# Patient Record
Sex: MALE | Race: WHITE | Employment: FULL TIME | URBAN - METROPOLITAN AREA
[De-identification: names, ages, dates, MRNs, and addresses within clinical notes are randomized per-mention and may not be internally consistent; named-entity substitution may affect disease eponyms.]

---

## 2017-01-12 ENCOUNTER — HOSPITAL ENCOUNTER (OUTPATIENT)
Dept: LAB | Age: 52
Discharge: HOME | End: 2017-01-12

## 2017-01-12 LAB — SENTARA SPECIMEN COL,SENBCF: NORMAL

## 2017-01-12 PROCEDURE — 99001 SPECIMEN HANDLING PT-LAB: CPT | Performed by: FAMILY MEDICINE

## 2017-01-13 PROBLEM — N20.0 RECURRENT KIDNEY STONES: Status: ACTIVE | Noted: 2017-01-13

## 2017-04-24 ENCOUNTER — OFFICE VISIT (OUTPATIENT)
Dept: ORTHOPEDIC SURGERY | Age: 52
End: 2017-04-24

## 2017-04-24 VITALS
BODY MASS INDEX: 28.38 KG/M2 | RESPIRATION RATE: 18 BRPM | TEMPERATURE: 98.3 F | WEIGHT: 180.8 LBS | SYSTOLIC BLOOD PRESSURE: 139 MMHG | DIASTOLIC BLOOD PRESSURE: 68 MMHG | OXYGEN SATURATION: 96 % | HEIGHT: 67 IN | HEART RATE: 76 BPM

## 2017-04-24 DIAGNOSIS — M51.36 DDD (DEGENERATIVE DISC DISEASE), LUMBAR: Primary | ICD-10-CM

## 2017-04-24 DIAGNOSIS — M51.26 HNP (HERNIATED NUCLEUS PULPOSUS), LUMBAR: ICD-10-CM

## 2017-04-24 DIAGNOSIS — M48.061 LUMBAR SPINAL STENOSIS: ICD-10-CM

## 2017-04-24 NOTE — PROGRESS NOTES
Chief complaint/History of Present Illness:  Chief Complaint   Patient presents with    Back Pain     6 month follow up     HPI  Erin Deshpande is a  45 y.o.  male      HISTORY OF PRESENT ILLNESS:  The patient comes in today for followup of his chronic low back pain. He occasionally gets some paresthesias down into his left leg. 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 It is on those days that he will have to take Tramadol 50 mg. He is only taking about one to two pills per week. He does not need a refill. He also complains of swelling in his hands. He has been evaluated by rheumatology in the past.  They cannot find anything for that. He is also taking Aleve for his arthritic pain. He does have multiple joint pain. PHYSICAL EXAM:  Mr. Og Jay is a 58-year-old male. He is alert and oriented. He has a full weight bearing, non-antalgic gait, 5/5 strength of the bilateral lower extremities, and normal tandem gait. ASSESSENT/PLAN:  This is a patient with lumbar spinal stenosis, degenerative disc disease, and herniated disc that gives him chronic low back pain. He is using Aleve with occasional Tramadol to control his pain. This keeps him very functional and enables him to continue working. He does not need a refill of the Tramadol at this time, so he will call us when he needs one. When he does call, we will give him 60 pills for a three-month supply. His  is appropriate for prescribing. He has a consistent UDS. We will get a UDS upon his return in six months. He will see Dr. Zaira Cr for M.D. followup.         Review of systems:    Past Medical History:   Diagnosis Date    Cancer [facility])     Prostate and colon    Chronic obstructive pulmonary disease ([facility] [address]     Depression     GERD (gastroesophageal reflux disease)     Hypercholesterolemia     Kidney stones     Low back pain with radiation      Past Surgical History:   Procedure Laterality Date    HX CHOLECYSTECTOMY      HX OTHER SURGICAL  2011    Colon resection and prostate surgery for cancer     Social History     Social History    Marital status:      Spouse name: N/A    Number of children: N/A    Years of education: N/A     Occupational History    working           Social History Main Topics    Smoking status: Former Smoker     Packs/day: 0.00     Types: Cigarettes     Quit date: 11/15/2015    Smokeless tobacco: Former User     Quit date: 9/10/2015    Alcohol use Yes      Comment: Saturday's    Drug use: No    Sexual activity: Not on file     Other Topics Concern    Not on file     Social History Narrative     Family History   Problem Relation Age of Onset    Hypertension Mother     Cancer Father      colon    Hypertension Brother        Physical Exam:  Visit Vitals    /68    Pulse 76    Temp 98.3 °F (36.8 °C) (Oral)    Resp 18    Ht 5' 7\" (1.702 m)    Wt 180 lb 12.8 oz (82 kg)    SpO2 96%    BMI 28.32 kg/m2     Pain Scale: 6/10     has been . reviewed and is appropriate    Pt has a consistent UDS in the record      Delma Reza was seen today for back pain. Diagnoses and all orders for this visit:    DDD (degenerative disc disease), lumbar    Lumbar spinal stenosis    HNP (herniated nucleus pulposus), lumbar          Follow-up Disposition:  Return in about 6 months (around 10/24/2017) for dr. Zainab Walls for MD f/u.         We have informed Lico Mccray to notify us for immediate appointment if he has any worsening neurogical symptoms or if an emergency situation presents, then call 911

## 2017-04-24 NOTE — PATIENT INSTRUCTIONS
Back Pain: Care Instructions  Your Care Instructions    Back pain has many possible causes. It is often related to problems with muscles and ligaments of the back. It may also be related to problems with the nerves, discs, or bones of the back. Moving, lifting, standing, sitting, or sleeping in an awkward way can strain the back. Sometimes you don't notice the injury until later. Arthritis is another common cause of back pain. Although it may hurt a lot, back pain usually improves on its own within several weeks. Most people recover in 12 weeks or less. Using good home treatment and being careful not to stress your back can help you feel better sooner. Follow-up care is a key part of your treatment and safety. Be sure to make and go to all appointments, and call your doctor if you are having problems. Its also a good idea to know your test results and keep a list of the medicines you take. How can you care for yourself at home? · Sit or lie in positions that are most comfortable and reduce your pain. Try one of these positions when you lie down:  ¨ Lie on your back with your knees bent and supported by large pillows. ¨ Lie on the floor with your legs on the seat of a sofa or chair. Mana Men on your side with your knees and hips bent and a pillow between your legs. ¨ Lie on your stomach if it does not make pain worse. · Do not sit up in bed, and avoid soft couches and twisted positions. Bed rest can help relieve pain at first, but it delays healing. Avoid bed rest after the first day of back pain. · Change positions every 30 minutes. If you must sit for long periods of time, take breaks from sitting. Get up and walk around, or lie in a comfortable position. · Try using a heating pad on a low or medium setting for 15 to 20 minutes every 2 or 3 hours. Try a warm shower in place of one session with the heating pad. · You can also try an ice pack for 10 to 15 minutes every 2 to 3 hours.  Put a thin cloth between the ice pack and your skin. · Take pain medicines exactly as directed. ¨ If the doctor gave you a prescription medicine for pain, take it as prescribed. ¨ If you are not taking a prescription pain medicine, ask your doctor if you can take an over-the-counter medicine. · Take short walks several times a day. You can start with 5 to 10 minutes, 3 or 4 times a day, and work up to longer walks. Walk on level surfaces and avoid hills and stairs until your back is better. · Return to work and other activities as soon as you can. Continued rest without activity is usually not good for your back. · To prevent future back pain, do exercises to stretch and strengthen your back and stomach. Learn how to use good posture, safe lifting techniques, and proper body mechanics. When should you call for help? Call your doctor now or seek immediate medical care if:  · You have new or worsening numbness in your legs. · You have new or worsening weakness in your legs. (This could make it hard to stand up.)  · You lose control of your bladder or bowels. Watch closely for changes in your health, and be sure to contact your doctor if:  · Your pain gets worse. · You are not getting better after 2 weeks. Where can you learn more? Go to http://real-amanda.info/. Enter O165 in the search box to learn more about \"Back Pain: Care Instructions. \"  Current as of: May 23, 2016  Content Version: 11.2  © 1440-9312 [employer], [employer]. Care instructions adapted under license by [employer] (which disclaims liability or warranty for this information). If you have questions about a medical condition or this instruction, always ask your healthcare professional. [address] any warranty or liability for your use of this information.

## 2017-06-19 ENCOUNTER — OFFICE VISIT (OUTPATIENT)
Dept: SURGERY | Age: 52
End: 2017-06-19

## 2017-06-19 VITALS — RESPIRATION RATE: 14 BRPM | HEIGHT: 67 IN | BODY MASS INDEX: 28.25 KG/M2 | WEIGHT: 180 LBS

## 2017-06-19 DIAGNOSIS — R22.9 SKIN NODULE: Primary | ICD-10-CM

## 2017-06-19 NOTE — MR AVS SNAPSHOT
Visit Information Date & Time Provider Department Dept. Phone Encounter #  
 6/19/2017 10:00 AM Keyon Camarena MD [facility] [phone] [id number] Upcoming Health Maintenance Date Due Hepatitis C Screening 1965 DTaP/Tdap/Td series (1 - Tdap) 8/14/1985 FOBT Q 1 YEAR AGE 50-75 8/14/2014 Pneumococcal 19-64 Highest Risk (2 of 3 - PCV13) 1/26/2017 INFLUENZA AGE 9 TO ADULT 8/1/2017 Allergies as of 6/19/2017  Review Complete On: 6/19/2017 By: Jocelynn Palomo LPN No Known Allergies Current Immunizations  Never Reviewed No immunizations on file. Not reviewed this visit Vitals Resp Height(growth percentile) Weight(growth percentile) BMI Smoking Status 14 5' 7\" (1.702 m) 180 lb (81.6 kg) 28.19 kg/m2 Former Smoker BMI and BSA Data Body Mass Index Body Surface Area  
 28.19 kg/m 2 1.96 m 2 Preferred Pharmacy Pharmacy Name Phone [facility] [address] [phone] Your Updated Medication List  
  
   
This list is accurate as of: 6/19/17 10:30 AM.  Always use your most recent med list.  
  
  
  
  
 aspirin 325 mg tablet Commonly known as:  ASPIRIN  
325 mg. CRESTOR 10 mg tablet Generic drug:  rosuvastatin Take 10 mg by mouth nightly. pantoprazole 40 mg tablet Commonly known as:  PROTONIX Take 40 mg by mouth daily. sertraline 50 mg tablet Commonly known as:  ZOLOFT Take  by mouth daily. [address] Take  by inhalation daily. traMADol 50 mg tablet Commonly known as:  ULTRAM  
2 po bid prn pain  Indications: PAIN Patient Instructions Call the office at [phone] if you have any questions or concerns. Please provide this summary of care documentation to your next provider. Your primary care clinician is listed as Tommy Presybeterian. If you have any questions after today's visit, please call [phone].

## 2017-06-19 NOTE — PROGRESS NOTES
Progress Note    Patient: Trista Godinez  MRN: [id number]  SSN: [id number]   YOB: 1965  Age: 45 y.o. Sex: male     Chief Complaint   Patient presents with    Cyst     cyst on front of neck        HPI    Mr. Vero Ceballos is a 58-year-old gentleman sent by his PCP for some kind a cyst just below his sternal notch. Though the patient seems to feel it seated I can either see it or feel it. We have talked about sebaceous cysts and how to remove them but we have also discussed the fact that if I cannot feel C it I cannot take it out. He has had no history of radiation to his head or neck. He has had colon and prostate cancer. This is not in an area where lymphadenopathy would be he tells me at superficial and in the skin. I have asked him to return should it enlarge. Past Medical History:   Diagnosis Date    Cancer [facility])     Prostate and colon    Chronic obstructive pulmonary disease ([facility] [address]     Depression     GERD (gastroesophageal reflux disease)     Hypercholesterolemia     Kidney stones     Low back pain with radiation      Past Surgical History:   Procedure Laterality Date    HX CHOLECYSTECTOMY      HX OTHER SURGICAL  2011    Colon resection and prostate surgery for cancer     No Known Allergies  Current Outpatient Prescriptions   Medication Sig Dispense Refill    traMADol (ULTRAM) 50 mg tablet 2 po bid prn pain  Indications: PAIN 120 Tab 2    aspirin (ASPIRIN) 325 mg tablet 325 mg.      TIOTROPIUM BROMIDE (SPIRIVA RESPIMAT IN) Take  by inhalation daily.  sertraline (ZOLOFT) 50 mg tablet Take  by mouth daily.  rosuvastatin (CRESTOR) 10 mg tablet Take 10 mg by mouth nightly.  pantoprazole (PROTONIX) 40 mg tablet Take 40 mg by mouth daily.          Social History     Social History    Marital status:      Spouse name: N/A    Number of children: N/A    Years of education: N/A     Occupational History    working           Social History Main Topics  Smoking status: Former Smoker     Packs/day: 0.00     Types: Cigarettes     Quit date: 11/15/2015    Smokeless tobacco: Former User     Quit date: 9/10/2015    Alcohol use Yes      Comment: Saturday's    Drug use: No    Sexual activity: Not on file     Other Topics Concern    Not on file     Social History Narrative     Family History   Problem Relation Age of Onset    Hypertension Mother     Cancer Father      colon    Hypertension Brother          Review of systems:  Patient denies any reflux, emesis, abdominal pain, change in bowel habits, hematochezia, melena, fever, weight loss, fatigue chills, dermatitis, abnormal moles, change in vision, vertigo, epistaxis, dysphagia, hoarseness, chest pain, palpitations, hypertension, edema, cough, shortness of breath, wheezing, hemoptysis, snoring, hematuria, diabetes, thyroid disease, anemia, bruising, history of blood transfusion, dizziness, headache, or fainting. Physical Examination    Well developed well nourished male in no apparent distress  Visit Vitals    Resp 14    Ht 5' 7\" (1.702 m)    Wt 81.6 kg (180 lb)    BMI 28.19 kg/m2      Head: normocephalic, atraumatic  Mouth: Clear, no overt lesions, oral mucosa pink and moist  Neck: supple, no masses, no adenopathy or carotid bruits, trachea midline  Resp: clear to auscultation bilaterally, no wheeze, rhonchi or rales, excursions normal and symmetrical  Cardio: Regular rate and rhythm, no murmurs, clicks, gallops or rubs, no edema or varicosities  Abdomen: soft, nontender, nondistended, normoactive bowel sounds, no hernias, no hepatosplenomegaly,   Back: Deferred  Extremeties: warm, well-perfused, no tenderness or swelling, normal gait/station  Neuro: sensation and strength grossly intact and symmetrical  Psych: alert and oriented to person, place and time  Breast exam deferred    IMPRESSION  Mass in the subcutaneous tissue just inferior to the sternal notch not visible or palpable.     PLAN  No orders of the defined types were placed in this encounter.     Follow-up as needed  Jackson Beaulieu MD

## 2017-10-09 ENCOUNTER — TELEPHONE (OUTPATIENT)
Dept: ORTHOPEDIC SURGERY | Age: 52
End: 2017-10-09

## 2017-10-09 NOTE — TELEPHONE ENCOUNTER
Spoke with patient, advised him that a follow up is needed prior to administering med refill, patient understood and appointment is set for October 11, 2017 at 11am.

## 2017-10-09 NOTE — TELEPHONE ENCOUNTER
Patient is requesting refill of Ultram (via [employer] website contact us form). States he had refills remaining from last rx but they have . Per last note patient would be due for a 6 month follow up toward the end of this month. Please advise and route msg accordingly.

## 2017-10-09 NOTE — TELEPHONE ENCOUNTER
Please have this patient make a medication follow up.  We will have to see him prior to prescribing this medication

## 2017-11-01 ENCOUNTER — HOSPITAL ENCOUNTER (OUTPATIENT)
Dept: LAB | Age: 52
Discharge: HOME | End: 2017-11-01

## 2017-11-01 LAB — SENTARA SPECIMEN COL,SENBCF: NORMAL

## 2017-11-01 PROCEDURE — 99001 SPECIMEN HANDLING PT-LAB: CPT | Performed by: INTERNAL MEDICINE

## 2018-02-08 ENCOUNTER — OFFICE VISIT (OUTPATIENT)
Dept: ORTHOPEDIC SURGERY | Age: 53
End: 2018-02-08

## 2018-02-08 VITALS
SYSTOLIC BLOOD PRESSURE: 127 MMHG | BODY MASS INDEX: 28.88 KG/M2 | HEART RATE: 66 BPM | OXYGEN SATURATION: 96 % | HEIGHT: 67 IN | TEMPERATURE: 97.8 F | RESPIRATION RATE: 20 BRPM | DIASTOLIC BLOOD PRESSURE: 66 MMHG | WEIGHT: 184 LBS

## 2018-02-08 DIAGNOSIS — M51.26 PROTRUSION OF LUMBAR INTERVERTEBRAL DISC: ICD-10-CM

## 2018-02-08 DIAGNOSIS — M48.062 SPINAL STENOSIS OF LUMBAR REGION WITH NEUROGENIC CLAUDICATION: Primary | ICD-10-CM

## 2018-02-08 DIAGNOSIS — M51.36 DDD (DEGENERATIVE DISC DISEASE), LUMBAR: Chronic | ICD-10-CM

## 2018-02-08 DIAGNOSIS — M48.062 SPINAL STENOSIS OF LUMBAR REGION WITH NEUROGENIC CLAUDICATION: ICD-10-CM

## 2018-02-08 DIAGNOSIS — Z79.891 ENCOUNTER FOR LONG-TERM OPIATE ANALGESIC USE: ICD-10-CM

## 2018-02-08 RX ORDER — TRAMADOL HYDROCHLORIDE 50 MG/1
50 TABLET ORAL
Qty: 60 TAB | Refills: 2 | Status: SHIPPED | OUTPATIENT
Start: 2018-02-08 | End: 2018-10-29 | Stop reason: SDUPTHER

## 2018-02-08 NOTE — MR AVS SNAPSHOT
[facility] [address] [facility] [address] 
[phone] Patient: Zbigniew Dozier MRN: [id number] :1965 Visit Information Date & Time Provider Department Dept. Phone Encounter #  
 2018 11:00 AM Effie Pierre NP [facility] [phone] [id number] Follow-up Instructions Return in about 6 months (around 2018) for with Narcisa. Your Appointments 2018  3:00 PM  
New Patient with Jonathan Vargas MD  
[facility] [address]) Appt Note: NP   needs  to  seen asap  (bell palsy)  [facility]  [facility]  . ...11; Dr. Farhad Crain; seizure; note in cc; np packet mailed bdc; 2017 Notes received from Dr. Colonel Contreras  placed in file; r/s due to work trip [address] [address] [phone]  
  
   
 [facility] [id number] Upcoming Health Maintenance Date Due Hepatitis C Screening 1965 DTaP/Tdap/Td series (1 - Tdap) 1985 FOBT Q 1 YEAR AGE 50-75 2014 Pneumococcal 19-64 Highest Risk (2 of 3 - PCV13) 2017 Influenza Age 5 to Adult 2017 Allergies as of 2018  Review Complete On: 2018 By: Wily Sherman LPN No Known Allergies Current Immunizations  Never Reviewed No immunizations on file. Not reviewed this visit You Were Diagnosed With   
  
 Codes Comments Spinal stenosis of lumbar region with neurogenic claudication    -  Primary ICD-10-CM: M48.062 
ICD-9-CM: 724.03   
 DDD (degenerative disc disease), lumbar     ICD-10-CM: M51.36 
ICD-9-CM: 722.52 Protrusion of lumbar intervertebral disc     ICD-10-CM: M51.26 
ICD-9-CM: 722.10 Encounter for long-term opiate analgesic use     ICD-10-CM: D29.626 ICD-9-CM: V58.69 Vitals BP Pulse Temp Resp Height(growth percentile) Weight(growth percentile) 127/66 66 97.8 °F (36.6 °C) (Oral) 20 5' 7\" (1.702 m) 184 lb (83.5 kg) SpO2 BMI Smoking Status 96% 28.82 kg/m2 Former Smoker BMI and BSA Data Body Mass Index Body Surface Area  
 28.82 kg/m 2 1.99 m 2 Preferred Pharmacy Pharmacy Name Phone [facility] [address] [place] [phone] Your Updated Medication List  
  
   
This list is accurate as of: 2/8/18 11:08 AM.  Always use your most recent med list.  
  
  
  
  
 aspirin 325 mg tablet Commonly known as:  ASPIRIN  
325 mg. CRESTOR 10 mg tablet Generic drug:  rosuvastatin Take 10 mg by mouth nightly. pantoprazole 40 mg tablet Commonly known as:  PROTONIX Take 40 mg by mouth daily. sertraline 50 mg tablet Commonly known as:  ZOLOFT Take  by mouth daily. [address] Take  by inhalation daily. traMADol 50 mg tablet Commonly known as:  ULTRAM  
Take 1 Tab by mouth two (2) times daily as needed. Max Daily Amount: 100 mg. 2 po bid prn pain  Indications: Pain, chronic pain Prescriptions Printed Refills  
 traMADol (ULTRAM) 50 mg tablet 2 Sig: Take 1 Tab by mouth two (2) times daily as needed. Max Daily Amount: 100 mg. 2 po bid prn pain  Indications: Pain, chronic pain  
 Class: Print Route: Oral  
  
Follow-up Instructions Return in about 6 months (around 8/8/2018) for with Narcisa. To-Do List   
 02/08/2018 Lab:  DRUG SCREEN UR - W/ CONFIRM Introducing hospitals & HEALTH SERVICES! Dear Mattie Zapata: Thank you for requesting a [employer] account. Our records indicate that you have previously registered for a [employer] account but its currently inactive. Please call our [employer] support line at [phone]. Additional Information If you have questions, please visit the Frequently Asked Questions section of the [employer] website at https://[employer]. [employer]/. Remember, MyChart is NOT to be used for urgent needs. For medical emergencies, dial 911. Now available from your iPhone and Android! Please provide this summary of care documentation to your next provider. Your primary care clinician is listed as Lazaro Schafer. If you have any questions after today's visit, please call [phone].

## 2018-02-08 NOTE — PROGRESS NOTES
Chief complaint/History of Present Illness:  Chief Complaint   Patient presents with    Back Pain     lower back pain follow up    Arm Pain    Hand Pain     HPI  Evie Sifuentes is a  47 y.o.  male      HISTORY OF PRESENT ILLNESS:  The patient comes in today for follow up of his chronic low back pain. He gets pain into his left lower extremity with paresthesias. He is using Aleve most of the time to help control his pain, but there are times when he needs to have a Tramadol. When he does excessive activity, he will take two tablets and then lie down. 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 That is when his pain is really bad, and he has to take the most Tramadol. It will bring his pain from an 8-10/10 down to a 2/10. Since we last saw him, between October and January, he was having some bradycardia and dizziness. He had that worked up. They did not really find anything. They referred him to a neurologist.  He apparently blacked out a couple of times during that time period. Other than that, he has had no new medical issues. He denies fever and bowel and bladder dysfunction. PHYSICAL EXAM:  Mr. La Silva is a 58-year-old male. He is alert and oriented. He has normal mood and affect. He has a full weightbearing, non-antalgic gait with 4/5 strength of the bilateral lower extremities and negative straight leg raise. He has pain with hyperextension of the lumbar spine. ASSESSENT/PLAN:  This is a patient with degenerative disc disease, spinal stenosis, and disc protrusion that gives him chronic back pain with left lower extremity pain and paresthesias. His ORT is 0/10. His last UDS was consistent. We are getting one today. His MME is 20. His  is appropriate. He did get one prescription of Percocet since we saw him for 12 pills. He thinks it may have been when he had kidney stones.   We will give him a refill of his Tramadol. He does not use it very much at all. We will see him back in six months or sooner needed. Review of systems:    Past Medical History:   Diagnosis Date    Cancer [facility])     Prostate and colon    Chronic obstructive pulmonary disease ([address]     Depression     GERD (gastroesophageal reflux disease)     Hypercholesterolemia     Kidney stones     Low back pain with radiation      Past Surgical History:   Procedure Laterality Date    HX CHOLECYSTECTOMY      HX OTHER SURGICAL  2011    Colon resection and prostate surgery for cancer     Social History     Social History    Marital status:      Spouse name: N/A    Number of children: N/A    Years of education: N/A     Occupational History    working           Social History Main Topics    Smoking status: Former Smoker     Packs/day: 0.00     Types: Cigarettes     Quit date: 11/15/2015    Smokeless tobacco: Former User     Quit date: 9/10/2015    Alcohol use Yes      Comment: Saturday's    Drug use: No    Sexual activity: Not on file     Other Topics Concern    Not on file     Social History Narrative     Family History   Problem Relation Age of Onset    Hypertension Mother     Cancer Father      colon    Hypertension Brother        Physical Exam:  Visit Vitals    /66    Pulse 66    Temp 97.8 °F (36.6 °C) (Oral)    Resp 20    Ht 5' 7\" (1.702 m)    Wt 184 lb (83.5 kg)    SpO2 96%    BMI 28.82 kg/m2     Pain Scale: 6/10    Least pain over the last week has been 2/10. Worst pain over the last week has been 10/10. Opioid Risk Tool Reviewed: YES, Score 0  Aberrant behaviors: None. Urine Drug Screen: orders placed today. Controlled substance agreement on file: yes.  reviewed:yes  Pill count is consistent with his prescription: n/a  Concomitant use of a benzodiazepine: no  MME 20  Narcan not warranted        Risks and benefits of ongoing opiate therapy have been reviewed with the patient.   No pain behaviors. Denies thoughts of harming self or others. Pt has a good risk to benefit ratio which allows the pt to function in a home environment without side effects        has been . reviewed and is appropriate    Pt has a consistent UDS in the record      Diagnoses and all orders for this visit:    1. Spinal stenosis of lumbar region with neurogenic claudication  -     traMADol (ULTRAM) 50 mg tablet; Take 1 Tab by mouth two (2) times daily as needed. Max Daily Amount: 100 mg. 2 po bid prn pain  Indications: Pain, chronic pain  -     DRUG SCREEN UR - W/ CONFIRM; Future    2. DDD (degenerative disc disease), lumbar  -     traMADol (ULTRAM) 50 mg tablet; Take 1 Tab by mouth two (2) times daily as needed. Max Daily Amount: 100 mg. 2 po bid prn pain  Indications: Pain, chronic pain  -     DRUG SCREEN UR - W/ CONFIRM; Future    3. Protrusion of lumbar intervertebral disc  -     traMADol (ULTRAM) 50 mg tablet; Take 1 Tab by mouth two (2) times daily as needed. Max Daily Amount: 100 mg. 2 po bid prn pain  Indications: Pain, chronic pain  -     DRUG SCREEN UR - W/ CONFIRM; Future    4. Encounter for long-term opiate analgesic use  -     traMADol (ULTRAM) 50 mg tablet; Take 1 Tab by mouth two (2) times daily as needed. Max Daily Amount: 100 mg. 2 po bid prn pain  Indications: Pain, chronic pain  -     DRUG SCREEN UR - W/ CONFIRM; Future            Follow-up Disposition:  Return in about 6 months (around 8/8/2018) for with Blas Berumen.         We have informed Gadiel Turnerdionna to notify us for immediate appointment if he has any worsening neurogical symptoms or if an emergency situation presents, then call 911

## 2018-02-16 ENCOUNTER — TELEPHONE (OUTPATIENT)
Dept: ORTHOPEDIC SURGERY | Age: 53
End: 2018-02-16

## 2018-02-16 NOTE — TELEPHONE ENCOUNTER
Breezy Jolly ([facility])  Need help? Call us at [phone]  Status: Sent to [address] February 14  Next Steps: The plan will fax you a determination, typically within 1 to 5 business days.

## 2018-03-12 ENCOUNTER — OFFICE VISIT (OUTPATIENT)
Dept: NEUROLOGY | Age: 53
End: 2018-03-12

## 2018-03-12 VITALS
TEMPERATURE: 98.1 F | DIASTOLIC BLOOD PRESSURE: 74 MMHG | SYSTOLIC BLOOD PRESSURE: 134 MMHG | HEIGHT: 67 IN | WEIGHT: 183 LBS | RESPIRATION RATE: 16 BRPM | HEART RATE: 74 BPM | OXYGEN SATURATION: 95 % | BODY MASS INDEX: 28.72 KG/M2

## 2018-03-12 DIAGNOSIS — S06.0X1S CONCUSSION WITH LOSS OF CONSCIOUSNESS OF 30 MINUTES OR LESS, SEQUELA (HCC): Primary | ICD-10-CM

## 2018-03-12 DIAGNOSIS — I69.392 CVA, OLD, FACIAL WEAKNESS: ICD-10-CM

## 2018-03-12 DIAGNOSIS — Z86.69 H/O BELL'S PALSY: ICD-10-CM

## 2018-03-12 PROBLEM — S06.0X1A CONCUSSION WITH LOSS OF CONSCIOUSNESS OF 30 MINUTES OR LESS: Status: ACTIVE | Noted: 2018-03-12
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
## 2018-03-22 ENCOUNTER — HOSPITAL ENCOUNTER (OUTPATIENT)
Age: 53
Discharge: HOME | End: 2018-03-22
Attending: PSYCHIATRY & NEUROLOGY
Payer: COMMERCIAL

## 2018-03-22 DIAGNOSIS — S06.0X1S CONCUSSION WITH LOSS OF CONSCIOUSNESS OF 30 MINUTES OR LESS, SEQUELA (HCC): ICD-10-CM

## 2018-03-22 DIAGNOSIS — I69.392 CVA, OLD, FACIAL WEAKNESS: ICD-10-CM

## 2018-03-22 PROCEDURE — 70549 MR ANGIOGRAPH NECK W/O&W/DYE: CPT

## 2018-03-22 PROCEDURE — 74011250636 HC RX REV CODE- 250/636: Performed by: PSYCHIATRY & NEUROLOGY

## 2018-03-22 PROCEDURE — 70553 MRI BRAIN STEM W/O & W/DYE: CPT

## 2018-03-22 PROCEDURE — 70544 MR ANGIOGRAPHY HEAD W/O DYE: CPT

## 2018-03-22 PROCEDURE — A9585 GADOBUTROL INJECTION: HCPCS | Performed by: PSYCHIATRY & NEUROLOGY

## 2018-03-22 RX ADMIN — GADOBUTROL 10 ML: 604.72 INJECTION INTRAVENOUS at 07:00

## 2018-04-02 NOTE — PROGRESS NOTES
Spoke with patient concerning result and provider's comment. Patient would like to cancel appointment at this time.

## 2018-05-17 ENCOUNTER — HOSPITAL ENCOUNTER (OUTPATIENT)
Dept: GENERAL RADIOLOGY | Age: 53
Discharge: HOME | End: 2018-05-17
Payer: COMMERCIAL

## 2018-05-17 DIAGNOSIS — R06.02 SHORTNESS OF BREATH: ICD-10-CM

## 2018-05-17 PROCEDURE — 71046 X-RAY EXAM CHEST 2 VIEWS: CPT

## 2018-06-20 ENCOUNTER — HOSPITAL ENCOUNTER (OUTPATIENT)
Dept: CT IMAGING | Age: 53
Discharge: HOME | End: 2018-06-20
Attending: NURSE PRACTITIONER
Payer: COMMERCIAL

## 2018-06-20 DIAGNOSIS — R06.02 SHORTNESS OF BREATH ON EXERTION: ICD-10-CM

## 2018-06-20 LAB — CREAT UR-MCNC: 1 MG/DL (ref 0.6–1.3)

## 2018-06-20 PROCEDURE — 71270 CT THORAX DX C-/C+: CPT

## 2018-06-20 PROCEDURE — 82565 ASSAY OF CREATININE: CPT

## 2018-06-20 PROCEDURE — 74011636320 HC RX REV CODE- 636/320

## 2018-06-20 RX ADMIN — IOPAMIDOL 80 ML: 612 INJECTION, SOLUTION INTRAVENOUS at 07:37

## 2018-07-06 ENCOUNTER — HOSPITAL ENCOUNTER (OUTPATIENT)
Dept: PET IMAGING | Age: 53
Discharge: HOME | End: 2018-07-06
Attending: NURSE PRACTITIONER
Payer: COMMERCIAL

## 2018-07-06 DIAGNOSIS — R91.8 PULMONARY NODULES: ICD-10-CM

## 2018-07-06 PROCEDURE — A9552 F18 FDG: HCPCS

## 2018-07-10 ENCOUNTER — OFFICE VISIT (OUTPATIENT)
Dept: PULMONOLOGY | Age: 53
End: 2018-07-10

## 2018-07-10 VITALS
WEIGHT: 182 LBS | RESPIRATION RATE: 19 BRPM | HEART RATE: 72 BPM | SYSTOLIC BLOOD PRESSURE: 150 MMHG | HEIGHT: 67 IN | DIASTOLIC BLOOD PRESSURE: 70 MMHG | BODY MASS INDEX: 28.56 KG/M2 | OXYGEN SATURATION: 98 % | TEMPERATURE: 97.9 F

## 2018-07-10 DIAGNOSIS — R91.8 PULMONARY NODULES: ICD-10-CM

## 2018-07-10 DIAGNOSIS — J44.9 CHRONIC OBSTRUCTIVE PULMONARY DISEASE, UNSPECIFIED COPD TYPE (HCC): Primary | ICD-10-CM

## 2018-07-10 RX ORDER — IBUPROFEN 200 MG
1 TABLET ORAL EVERY 24 HOURS
Qty: 30 PATCH | Refills: 1 | Status: SHIPPED | OUTPATIENT
Start: 2018-07-10 | End: 2018-08-09

## 2018-07-10 RX ORDER — ALBUTEROL SULFATE 90 UG/1
2 AEROSOL, METERED RESPIRATORY (INHALATION)
COMMUNITY

## 2018-07-10 RX ORDER — PRAVASTATIN SODIUM 20 MG/1
20 TABLET ORAL
COMMUNITY

## 2018-07-10 NOTE — PROGRESS NOTES
Mr. Yordan Motley has a reminder for a \"due or due soon\" health maintenance. I have asked that he contact his primary care provider for follow-up on this health maintenance.   Chief Complaint   Patient presents with    COPD

## 2018-07-10 NOTE — PROGRESS NOTES
[facility]  Pulmonary, Critical Care, and Sleep Medicine    Dear Aimee Moya,    Chief complaint:  Pulmonary nodules    HPI:  Mary Traylor is 47years old and comes to the office today at your request concerning pulmonary nodules which were noted incidentally when evaluating respiratory symptoms approximately 2 months ago. Since then the patient has had a PET scan. He relates that his respiratory symptoms have improved but he still has chronic shortness of breath for about a year when he exerts himself with activity like hurrying. He denies chest pain chronic cough or ankle swelling. He does have difficulty with sleeping at times and some daytime sleepiness but no witnessed sleep apnea  No Known Allergies  Current Outpatient Prescriptions   Medication Sig    albuterol (PROAIR HFA) 90 mcg/actuation inhaler Take  by inhalation.  umeclidinium-vilanterol (ANORO ELLIPTA) 62.5-25 mcg/actuation inhaler Take 1 Puff by inhalation daily.  pravastatin (PRAVACHOL) 20 mg tablet Take 20 mg by mouth nightly.  multivit-min/FA/lycopen/lutein (CENTRUM SILVER MEN PO) Take  by mouth.  traMADol (ULTRAM) 50 mg tablet Take 1 Tab by mouth two (2) times daily as needed. Max Daily Amount: 100 mg. 2 po bid prn pain  Indications: Pain, chronic pain    sertraline (ZOLOFT) 50 mg tablet Take  by mouth daily.  pantoprazole (PROTONIX) 40 mg tablet Take 40 mg by mouth daily.  aspirin (ASPIRIN) 325 mg tablet 325 mg.    TIOTROPIUM BROMIDE (SPIRIVA RESPIMAT IN) Take  by inhalation daily.  rosuvastatin (CRESTOR) 10 mg tablet Take 10 mg by mouth nightly. No current facility-administered medications for this visit.       Past Medical History:   Diagnosis Date    Cancer [facility])     Prostate and colon    Chronic lung disease     Chronic obstructive pulmonary disease ([facility] [address]     Depression     GERD (gastroesophageal reflux disease)     Hypercholesterolemia     Kidney stones     Low back pain with radiation    He denies a history of heart disease hypertension diabetes liver disease ulcers tuberculosis cancer  Past Surgical History:   Procedure Laterality Date    HX CHOLECYSTECTOMY      HX OTHER SURGICAL  2011    Colon resection and prostate surgery for cancer       Family history: Noncontributory    Social History: Started smoking about 38 years ago and continues to smoke about 10 cigarettes or more a day. 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   He denies focal muscle weakness or numbness trouble hearing trouble seeing trouble swallowing chronic abdominal pain melena or blood in his stools dysuria hematuria rashes but has significant arthritic complaints he denies fever chills poor appetite or significant weight loss    Physical Exam:  Visit Vitals    /70 (BP 1 Location: Left arm, BP Patient Position: At rest)    Pulse 72    Temp 97.9 °F (36.6 °C) (Oral)    Resp 19    Ht 5' 7\" (1.702 m)    Wt 82.6 kg (182 lb)    SpO2 98%    BMI 28.51 kg/m2     Well-developed well-nourished  HEENT: pupils equal, reactive, sclera, non-icteric   Oropharynx tongue: normal   Neck: Supple   Lymph Nodes: Supra clavicular and cervical nodes, negative   Chest: Equal symmetrical expansion, no dullness, no wheezes, rales or rubs   Heart: Regular, rhythm without afia or murmur   Abdomen: soft, non-tender no masses or organomegaly   Extremities: no cyanosis, clubbing, no edema no calf tenderness  Musculoskeletal: No acute joint inflammation or muscle wasting  Skin: No rash   Neurological: alert, oriented, moves all extremities      LABS:  O2 sat room air at rest 98%  Chest x-ray 5/17/18 personally reviewed hypoventilation possible elevated right diaphragm  CT of the chest 6/20/18 personally reviewed with 2 pulmonary nodules about 6 mm in size almost next to each other in the right lower  PET scan 7/6/18 showing low uptake for the 2 pulmonary nodules but a higher than normal background  Impression:   Pulmonary nodules and high risk individual due to cigarette smoking with PET scan showing no uptake but the nodules are too small to rely on this parameter for malignancy. Nicotine dependency    Plan:  Follow-up CT at 3 months per Fleischner Society recommendations for multiple pulmonary nodules  The patient was given a modest strategy to help him stop smoking    Thanks for allowing me to share in this patient's evaluation    Sincerely,    Marisel Oden MD , [facility]    CC: Lorraine Barr MD    [address] [facility]. [address]  [place], [address]     P: [phone]     F: [phone]

## 2018-07-10 NOTE — MR AVS SNAPSHOT
[address] [facility], [address] [address] 
[phone] Patient: Polina Wall MRN: [id number] :1965 Visit Information Date & Time Provider Department Dept. Phone Encounter #  
 7/10/2018  2:30 PM Love Michael MD [id number] [id number] Follow-up Instructions Return in about 4 months (around 11/10/2018). Follow-up and Disposition History Your Appointments 2018  9:15 AM  
Follow Up with Love Michael MD  
[address] ([address]) Appt Note: FROM 7/10/18 - CT PRIOR  
 [address] [place] [address], [address] [address]  
[phone]  
  
   
 [address] [place] [address], [address] [facility] [place] [address] Upcoming Health Maintenance Date Due Hepatitis C Screening 1965 DTaP/Tdap/Td series (1 - Tdap) 1985 FOBT Q 1 YEAR AGE 50-75 2014 Pneumococcal 19-64 Highest Risk (2 of 3 - PCV13) 2017 Influenza Age 5 to Adult 2018 Allergies as of 7/10/2018  Review Complete On: 7/10/2018 By: RT Joe No Known Allergies Current Immunizations  Never Reviewed No immunizations on file. Not reviewed this visit You Were Diagnosed With   
  
 Codes Comments Chronic obstructive pulmonary disease, unspecified COPD type ([facility]    -  Primary ICD-10-CM: J44.9 ICD-9-CM: 021 Pulmonary nodules     ICD-10-CM: R91.8 ICD-9-CM: 793.19 Vitals BP Pulse Temp Resp Height(growth percentile) Weight(growth percentile) 150/70 (BP 1 Location: Left arm, BP Patient Position: At rest) 72 97.9 °F (36.6 °C) (Oral) 19 5' 7\" (1.702 m) 182 lb (82.6 kg) SpO2 BMI Smoking Status 98% 28.51 kg/m2 Current Every Day Smoker BMI and BSA Data Body Mass Index Body Surface Area 28.51 kg/m 2 1.98 m 2 Preferred Pharmacy Pharmacy Name Phone [facility] [address] [place] [address] [phone] Your Updated Medication List  
  
   
This list is accurate as of 7/10/18  3:19 PM.  Always use your most recent med list.  
  
  
  
  
 ANORO ELLIPTA 62.5-25 mcg/actuation inhaler Generic drug:  umeclidinium-vilanterol Take 1 Puff by inhalation daily. aspirin 325 mg tablet Commonly known as:  ASPIRIN  
325 mg. CENTRUM SILVER MEN PO Take  by mouth. CRESTOR 10 mg tablet Generic drug:  rosuvastatin Take 10 mg by mouth nightly. nicotine 14 mg/24 hr patch Commonly known as:  NICODERM CQ  
1 Patch by TransDERmal route every twenty-four (24) hours for 30 days. pantoprazole 40 mg tablet Commonly known as:  PROTONIX Take 40 mg by mouth daily. pravastatin 20 mg tablet Commonly known as:  PRAVACHOL Take 20 mg by mouth nightly. PROAIR HFA 90 mcg/actuation inhaler Generic drug:  albuterol Take  by inhalation. sertraline 50 mg tablet Commonly known as:  ZOLOFT Take  by mouth daily. [address] Take  by inhalation daily. traMADol 50 mg tablet Commonly known as:  ULTRAM  
Take 1 Tab by mouth two (2) times daily as needed. Max Daily Amount: 100 mg. 2 po bid prn pain  Indications: Pain, chronic pain Prescriptions Sent to Pharmacy Refills  
 nicotine (NICODERM CQ) 14 mg/24 hr patch 1 Si Patch by TransDERmal route every twenty-four (24) hours for 30 days. Class: Normal  
 Pharmacy: [facility] [address] [place] [address] Ph #: [phone] Route: TransDERmal  
  
We Performed the Following AMB POC PFT COMPLETE W/BRONCHODILATOR [03128 CPT(R)] Follow-up Instructions Return in about 4 months (around 11/10/2018). To-Do List   
 07/10/2018 Procedures: AMB POC PFT COMPLETE W/BRONCHODILATOR Around 2018 Imaging:  CT CHEST WO CONT Patient Instructions Consider discontinuing Anoro Call for symptoms of worsening shortness of breath Do everything you can to stop smoking including substituting hard candy and sugarless gum when you want to smoke. Put the hard candy and sugarless gum in your pockets ashtrays automobile or any place she might consider smoking Use a #14 nicotine patch every day when she stopped smoking now Patient Instructions History Introducing [facility]! [employer] introduces [employer] patient portal. Now you can access parts of your medical record, email your doctor's office, and request medication refills online. 1. In your internet browser, go to [email or web address] 2. Click on the First Time User? Click Here link in the Sign In box. You will see the New Member Sign Up page. 3. Enter your [employer] Access Code exactly as it appears below. You will not need to use this code after youve completed the sign-up process. If you do not sign up before the expiration date, you must request a new code. · [employer] Access Code: [email or web address] Expires: 9/25/2018 11:01 AM 
 
4. Enter the last four digits of your Social Security Number (xxxx) and Date of Birth (mm/dd/yyyy) as indicated and click Submit. You will be taken to the next sign-up page. 5. Create a [employer] ID. This will be your [employer] login ID and cannot be changed, so think of one that is secure and easy to remember. 6. Create a [employer] password. You can change your password at any time. 7. Enter your Password Reset Question and Answer. This can be used at a later time if you forget your password. 8. Enter your e-mail address. You will receive e-mail notification when new information is available in [address] 9. Click Sign Up. You can now view and download portions of your medical record.  
10. Click the Download Summary menu link to download a portable copy of your medical information. If you have questions, please visit the Frequently Asked Questions section of the [id number] website. Remember, [employer] is NOT to be used for urgent needs. For medical emergencies, dial 911. Now available from your iPhone and Android! Please provide this summary of care documentation to your next provider. Your primary care clinician is listed as Darylene Lango. If you have any questions after today's visit, please call [phone].

## 2018-07-10 NOTE — PROGRESS NOTES
Verbal Order with read back per Kala Juárez MD  For PFT smart panel. AMB POC PFT complete w/ bronchodilator  AMB POC PFT complete w/o bronchodilator    Dr. Halima Puri MD will co-sign the orders.

## 2018-07-10 NOTE — PATIENT INSTRUCTIONS
Consider discontinuing Anoro    Call for symptoms of worsening shortness of breath    Do everything you can to stop smoking including substituting hard candy and sugarless gum when you want to smoke.   Put the hard candy and sugarless gum in your pockets ashtrays automobile or any place she might consider smoking    Use a #14 nicotine patch every day when she stopped smoking now

## 2018-08-27 NOTE — TELEPHONE ENCOUNTER
PT CALLED([phone]). [facility] PUT PT ON PATCHES AND THEY ARE NOT WORKING. HE WOULD LIKE TO TRY FRED. [facility] [phone].

## 2018-08-28 RX ORDER — VARENICLINE TARTRATE 25 MG
KIT ORAL
Qty: 1 DOSE PACK | Refills: 5 | Status: SHIPPED | OUTPATIENT
Start: 2018-08-28 | End: 2020-01-07

## 2018-09-24 ENCOUNTER — HOSPITAL ENCOUNTER (OUTPATIENT)
Dept: CT IMAGING | Age: 53
Discharge: HOME | End: 2018-09-24
Attending: INTERNAL MEDICINE
Payer: COMMERCIAL

## 2018-09-24 DIAGNOSIS — R91.8 PULMONARY NODULES: ICD-10-CM

## 2018-09-24 PROCEDURE — 71250 CT THORAX DX C-: CPT

## 2018-09-25 ENCOUNTER — TELEPHONE (OUTPATIENT)
Dept: PULMONOLOGY | Age: 53
End: 2018-09-25

## 2018-09-25 DIAGNOSIS — R91.8 PULMONARY NODULES: Primary | ICD-10-CM

## 2018-09-25 NOTE — TELEPHONE ENCOUNTER
Pt would like results of scan he had done yesterday at [facility]. Please call [phone].

## 2018-09-25 NOTE — TELEPHONE ENCOUNTER
Per verbal order from Dr. Sheila Mckinney. Advise pt CT stable, repeat CT 6mo with f/u appt after CT.  Pt understands

## 2018-10-29 ENCOUNTER — OFFICE VISIT (OUTPATIENT)
Dept: ORTHOPEDIC SURGERY | Age: 53
End: 2018-10-29

## 2018-10-29 VITALS
DIASTOLIC BLOOD PRESSURE: 74 MMHG | SYSTOLIC BLOOD PRESSURE: 133 MMHG | OXYGEN SATURATION: 97 % | HEART RATE: 70 BPM | TEMPERATURE: 98 F | BODY MASS INDEX: 28.88 KG/M2 | RESPIRATION RATE: 18 BRPM | HEIGHT: 67 IN | WEIGHT: 184 LBS

## 2018-10-29 DIAGNOSIS — M48.062 SPINAL STENOSIS OF LUMBAR REGION WITH NEUROGENIC CLAUDICATION: ICD-10-CM

## 2018-10-29 DIAGNOSIS — M51.26 PROTRUSION OF LUMBAR INTERVERTEBRAL DISC: ICD-10-CM

## 2018-10-29 DIAGNOSIS — Z79.891 LONG-TERM CURRENT USE OF OPIATE ANALGESIC: ICD-10-CM

## 2018-10-29 DIAGNOSIS — M51.36 DDD (DEGENERATIVE DISC DISEASE), LUMBAR: ICD-10-CM

## 2018-10-29 DIAGNOSIS — M51.36 DDD (DEGENERATIVE DISC DISEASE), LUMBAR: Primary | ICD-10-CM

## 2018-10-29 RX ORDER — GABAPENTIN 300 MG/1
300 CAPSULE ORAL
Qty: 30 CAP | Refills: 1 | Status: SHIPPED | OUTPATIENT
Start: 2018-10-29 | End: 2021-05-28

## 2018-10-29 RX ORDER — TRAMADOL HYDROCHLORIDE 50 MG/1
50 TABLET ORAL
Qty: 60 TAB | Refills: 0 | Status: SHIPPED | OUTPATIENT
Start: 2018-10-29 | End: 2020-01-07

## 2018-10-29 NOTE — PATIENT INSTRUCTIONS
Back Pain: Care Instructions  Your Care Instructions    Back pain has many possible causes. It is often related to problems with muscles and ligaments of the back. It may also be related to problems with the nerves, discs, or bones of the back. Moving, lifting, standing, sitting, or sleeping in an awkward way can strain the back. Sometimes you don't notice the injury until later. Arthritis is another common cause of back pain. Although it may hurt a lot, back pain usually improves on its own within several weeks. Most people recover in 12 weeks or less. Using good home treatment and being careful not to stress your back can help you feel better sooner. Follow-up care is a key part of your treatment and safety. Be sure to make and go to all appointments, and call your doctor if you are having problems. It's also a good idea to know your test results and keep a list of the medicines you take. How can you care for yourself at home? · Sit or lie in positions that are most comfortable and reduce your pain. Try one of these positions when you lie down:  ? Lie on your back with your knees bent and supported by large pillows. ? Lie on the floor with your legs on the seat of a sofa or chair. ? Lie on your side with your knees and hips bent and a pillow between your legs. ? Lie on your stomach if it does not make pain worse. · Do not sit up in bed, and avoid soft couches and twisted positions. Bed rest can help relieve pain at first, but it delays healing. Avoid bed rest after the first day of back pain. · Change positions every 30 minutes. If you must sit for long periods of time, take breaks from sitting. Get up and walk around, or lie in a comfortable position. · Try using a heating pad on a low or medium setting for 15 to 20 minutes every 2 or 3 hours. Try a warm shower in place of one session with the heating pad. · You can also try an ice pack for 10 to 15 minutes every 2 to 3 hours.  Put a thin cloth between the ice pack and your skin. · Take pain medicines exactly as directed. ? If the doctor gave you a prescription medicine for pain, take it as prescribed. ? If you are not taking a prescription pain medicine, ask your doctor if you can take an over-the-counter medicine. · Take short walks several times a day. You can start with 5 to 10 minutes, 3 or 4 times a day, and work up to longer walks. Walk on level surfaces and avoid hills and stairs until your back is better. · Return to work and other activities as soon as you can. Continued rest without activity is usually not good for your back. · To prevent future back pain, do exercises to stretch and strengthen your back and stomach. Learn how to use good posture, safe lifting techniques, and proper body mechanics. When should you call for help? Call your doctor now or seek immediate medical care if:    · You have new or worsening numbness in your legs.     · You have new or worsening weakness in your legs. (This could make it hard to stand up.)     · You lose control of your bladder or bowels.    Watch closely for changes in your health, and be sure to contact your doctor if:    · You have a fever, lose weight, or don't feel well.     · You do not get better as expected. Where can you learn more? Go to http://real-amanda.info/. Enter F221 in the search box to learn more about \"Back Pain: Care Instructions. \"  Current as of: November 29, 2017  Content Version: 11.8  © 4466-5562 [employer]. Care instructions adapted under license by [employer] (which disclaims liability or warranty for this information). If you have questions about a medical condition or this instruction, always ask your healthcare professional. [place] [address] any warranty or liability for your use of this information.

## 2018-10-29 NOTE — PROGRESS NOTES
Chief complaint/History of Present Illness:  Chief Complaint   Patient presents with    Back Pain     /u     HPI  Tomasa Pena is a  46 y.o.  male      HISTORY OF PRESENT ILLNESS:  The patient comes in today for followup of his chronic low back pain. He has paresthesias into his left lower extremity down to his ankle. He states he is taking Aleve every morning and Tylenol Arthritis in the evening. He will take tramadol when the pain is severe, when it gets up to an 8/10. When he takes it, it brings it down to a 2/10. He usually lays down afterward also. His last dose was 2 months ago when he ran out. Usually, 1 prescription of 60 pills lasts him 3 to 8 months. He still has 2 refills on the last prescription we gave him, however, they have . He states he no longer has dizziness or blacks out. They felt like it was from his blood pressure fluctuating, but he is not having that issue anymore. New medical issues include 2 pulmonary nodules in his right lung that he is getting serial CTs for. He was seeing Dr. Rose Nicole, however, he states he had issues with him and is now looking for a new pulmonologist.  He denies fever, bowel or bladder dysfunction. PHYSICAL EXAMINATION:  Mr. Tima Pineda is a 54-year-old male. He is alert and oriented. Normal mood and affect. He has a full weightbearing nonantalgic gait. No assistive device. He has 5/5 strength in bilateral lower extremities. He has pain with hyperextension of the lumbar spine. ASSESSMENT/PLAN:  This is a patient with degenerative disc disease, spinal stenosis and a disc protrusion that gives him low back pain with left lower extremity pain and paresthesias. 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 He has to get in and out of cars quite a bit. He has decreased his smoking to 1/3-pack of cigarettes a day and continues to try to quit. His ORT score is 0.   MME is 20.  UDS is being repeated today. The last one in 2018 was consistent.  is appropriate. I am going to start him on Neurontin 300 mg at bedtime to see if we can help with the neuropathic pain in his leg. I have given him 1 refill of tramadol. We are getting a new UDS. Further refills dependent upon the results of the UDS. I am going to see him back in 6 weeks to see how the gabapentin is doing.         Review of systems:    Past Medical History:   Diagnosis Date    Cancer [facility])     Prostate and colon    Chronic lung disease     Chronic obstructive pulmonary disease (HCC)     Depression     GERD (gastroesophageal reflux disease)     Hypercholesterolemia     Kidney stones     Low back pain with radiation      Past Surgical History:   Procedure Laterality Date    HX CHOLECYSTECTOMY      HX OTHER SURGICAL  2011    Colon resection and prostate surgery for cancer     Social History     Socioeconomic History    Marital status:      Spouse name: Not on file    Number of children: Not on file    Years of education: Not on file    Highest education level: Not on file   Social Needs    Financial resource strain: Not on file    Food insecurity - worry: Not on file    Food insecurity - inability: Not on file   [employer] needs - medical: Not on file   [employer] needs - non-medical: Not on file   Occupational History    Occupation: working     Comment:    Tobacco Use    Smoking status: Current Every Day Smoker     Packs/day: 0.50     Years: 40.00     Pack years: 20.00     Types: Cigarettes     Last attempt to quit: 11/15/2015     Years since quittin.9    Smokeless tobacco: Former User     Quit date: 9/10/2015   Substance and Sexual Activity    Alcohol use: Yes     Comment:     Drug use: No    Sexual activity: Not on file   Other Topics Concern    Not on file   Social History Narrative    Not on file     Family History   Problem Relation Age of Onset    Hypertension Mother     Cancer Father         colon    Hypertension Brother     Cancer Maternal Grandfather        Physical Exam:  Visit Vitals  /74   Pulse 70   Temp 98 °F (36.7 °C)   Resp 18   Ht 5' 7\" (1.702 m)   Wt 184 lb (83.5 kg)   SpO2 97%   BMI 28.82 kg/m²     Pain Scale: 4/10    Least pain over the last week has been 2/10. Worst pain over the last week has been 8/10. Opioid Risk Tool Reviewed: YES, Score 0  Aberrant behaviors: None. Urine Drug Screen: today. Controlled substance agreement on file: yes.  reviewed:yes  Pill count is consistent with his prescription: n/a  Concomitant use of a benzodiazepine: no  MME 20  Narcan not warranted        Risks and benefits of ongoing opiate therapy have been reviewed with the patient. No pain behaviors. Denies thoughts of harming self or others. Pt has a good risk to benefit ratio which allows the pt to function in a home environment without side effects        has been . reviewed and is appropriate    Pt has a consistent UDS in the record      Diagnoses and all orders for this visit:    1. DDD (degenerative disc disease), lumbar  -     DRUG SCREEN UR - W/ CONFIRM; Future    2. Spinal stenosis of lumbar region with neurogenic claudication  -     DRUG SCREEN UR - W/ CONFIRM; Future    3. Protrusion of lumbar intervertebral disc  -     DRUG SCREEN UR - W/ CONFIRM; Future    4. Long-term current use of opiate analgesic  -     DRUG SCREEN UR - W/ CONFIRM; Future            Follow-up Disposition:  Return in about 6 weeks (around 12/10/2018) for with NP.         We have informed Garima Still to notify us for immediate appointment if he has any worsening neurogical symptoms or if an emergency situation presents, then call [phone]

## 2018-12-28 ENCOUNTER — HOSPITAL ENCOUNTER (OUTPATIENT)
Dept: CT IMAGING | Age: 53
Discharge: HOME | End: 2018-12-28
Attending: INTERNAL MEDICINE
Payer: COMMERCIAL

## 2018-12-28 DIAGNOSIS — R91.8 LUNG NODULES: ICD-10-CM

## 2018-12-28 DIAGNOSIS — J43.2 CENTRILOBULAR EMPHYSEMA (HCC): ICD-10-CM

## 2018-12-28 PROCEDURE — 71250 CT THORAX DX C-: CPT

## 2019-06-07 ENCOUNTER — HOSPITAL ENCOUNTER (OUTPATIENT)
Dept: CT IMAGING | Age: 54
Discharge: HOME | End: 2019-06-07
Attending: INTERNAL MEDICINE
Payer: COMMERCIAL

## 2019-06-07 DIAGNOSIS — R91.8 OTHER NONSPECIFIC ABNORMAL FINDING OF LUNG FIELD: ICD-10-CM

## 2019-06-07 PROCEDURE — 71250 CT THORAX DX C-: CPT

## 2019-07-02 PROBLEM — R91.8 LUNG NODULES: Status: ACTIVE | Noted: 2019-07-02

## 2019-07-02 PROBLEM — Z87.891 SMOKING HISTORY: Status: ACTIVE | Noted: 2019-07-02

## 2019-07-02 PROBLEM — F17.200 SMOKING: Status: ACTIVE | Noted: 2019-07-02

## 2019-07-02 PROBLEM — J43.2 CENTRILOBULAR EMPHYSEMA (HCC): Status: ACTIVE | Noted: 2019-07-02

## 2020-01-28 ENCOUNTER — HOSPITAL ENCOUNTER (OUTPATIENT)
Dept: GENERAL RADIOLOGY | Age: 55
Discharge: HOME | End: 2020-01-28
Payer: COMMERCIAL

## 2020-01-28 DIAGNOSIS — J11.1 FLU SYNDROME: ICD-10-CM

## 2020-01-28 PROCEDURE — 71046 X-RAY EXAM CHEST 2 VIEWS: CPT

## 2020-06-02 ENCOUNTER — NURSE NAVIGATOR (OUTPATIENT)
Dept: OTHER | Age: 55
End: 2020-06-02

## 2020-06-03 ENCOUNTER — HOSPITAL ENCOUNTER (OUTPATIENT)
Dept: CT IMAGING | Age: 55
Discharge: HOME | End: 2020-06-03
Attending: INTERNAL MEDICINE
Payer: COMMERCIAL

## 2020-06-03 VITALS — WEIGHT: 175 LBS | HEIGHT: 67 IN | BODY MASS INDEX: 27.47 KG/M2

## 2020-06-03 DIAGNOSIS — Z87.891 PERSONAL HISTORY OF TOBACCO USE, PRESENTING HAZARDS TO HEALTH: ICD-10-CM

## 2020-06-03 PROCEDURE — G0297 LDCT FOR LUNG CA SCREEN: HCPCS

## 2020-07-07 ENCOUNTER — OFFICE VISIT (OUTPATIENT)
Dept: ORTHOPEDIC SURGERY | Age: 55
End: 2020-07-07

## 2020-07-07 VITALS
HEART RATE: 65 BPM | OXYGEN SATURATION: 98 % | HEIGHT: 67 IN | WEIGHT: 180.8 LBS | DIASTOLIC BLOOD PRESSURE: 76 MMHG | TEMPERATURE: 98.1 F | RESPIRATION RATE: 16 BRPM | SYSTOLIC BLOOD PRESSURE: 137 MMHG | BODY MASS INDEX: 28.38 KG/M2

## 2020-07-07 DIAGNOSIS — M54.50 LUMBAR PAIN: ICD-10-CM

## 2020-07-07 DIAGNOSIS — M54.6 ACUTE MIDLINE THORACIC BACK PAIN: ICD-10-CM

## 2020-07-07 DIAGNOSIS — M62.838 MUSCLE SPASM: ICD-10-CM

## 2020-07-07 DIAGNOSIS — M25.511 CHRONIC RIGHT SHOULDER PAIN: ICD-10-CM

## 2020-07-07 DIAGNOSIS — M54.2 CERVICAL PAIN: Primary | ICD-10-CM

## 2020-07-07 DIAGNOSIS — M16.12 PRIMARY OSTEOARTHRITIS OF LEFT HIP: ICD-10-CM

## 2020-07-07 DIAGNOSIS — G89.29 CHRONIC RIGHT SHOULDER PAIN: ICD-10-CM

## 2020-07-07 DIAGNOSIS — M50.30 DDD (DEGENERATIVE DISC DISEASE), CERVICAL: ICD-10-CM

## 2020-07-07 DIAGNOSIS — R29.2 HYPERREFLEXIA: ICD-10-CM

## 2020-07-07 RX ORDER — KETOROLAC TROMETHAMINE 15 MG/ML
60 INJECTION, SOLUTION INTRAMUSCULAR; INTRAVENOUS ONCE
Qty: 1 VIAL | Refills: 0 | Status: CANCELLED
Start: 2020-07-07 | End: 2020-07-07

## 2020-07-07 RX ORDER — METHYLPREDNISOLONE 4 MG/1
TABLET ORAL
Qty: 1 DOSE PACK | Refills: 0 | Status: SHIPPED | OUTPATIENT
Start: 2020-07-07 | End: 2020-09-08 | Stop reason: ALTCHOICE

## 2020-07-07 RX ORDER — CELECOXIB 200 MG/1
CAPSULE ORAL
Qty: 60 CAP | Refills: 1 | Status: SHIPPED | OUTPATIENT
Start: 2020-07-07 | End: 2020-09-08 | Stop reason: SDUPTHER

## 2020-07-07 RX ORDER — SERTRALINE HYDROCHLORIDE 100 MG/1
2 TABLET, FILM COATED ORAL DAILY
COMMUNITY
Start: 2020-06-30

## 2020-07-07 NOTE — PATIENT INSTRUCTIONS

## 2020-07-07 NOTE — LETTER
7/7/20 Patient: Keon Guaman YOB: 1965 Date of Visit: 7/7/2020 Erika Chauhan MD 
[address] [facility] [address] [facility] [address] VIA Facsimile: [phone] Dear Erika Chauhan MD, Thank you for referring Mr. Mignon Groves to [address] [place] [address] for evaluation. My notes for this consultation are attached. If you have questions, please do not hesitate to call me. I look forward to following your patient along with you. Sincerely, Toi Wolff MD
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
## 2020-07-20 ENCOUNTER — HOSPITAL ENCOUNTER (OUTPATIENT)
Dept: PHYSICAL THERAPY | Age: 55
Discharge: HOME | End: 2020-07-20
Payer: COMMERCIAL

## 2020-07-20 PROCEDURE — 97161 PT EVAL LOW COMPLEX 20 MIN: CPT

## 2020-07-20 PROCEDURE — 97110 THERAPEUTIC EXERCISES: CPT

## 2020-07-20 PROCEDURE — 97140 MANUAL THERAPY 1/> REGIONS: CPT
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
## 2020-07-21 ENCOUNTER — HOSPITAL ENCOUNTER (OUTPATIENT)
Age: 55
Setting detail: OUTPATIENT SURGERY
Discharge: HOME | End: 2020-07-21
Attending: PHYSICAL MEDICINE & REHABILITATION | Admitting: PHYSICAL MEDICINE & REHABILITATION
Payer: COMMERCIAL

## 2020-07-21 ENCOUNTER — APPOINTMENT (OUTPATIENT)
Dept: GENERAL RADIOLOGY | Age: 55
End: 2020-07-21
Attending: PHYSICAL MEDICINE & REHABILITATION
Payer: COMMERCIAL

## 2020-07-21 VITALS
TEMPERATURE: 98.3 F | HEART RATE: 57 BPM | RESPIRATION RATE: 17 BRPM | OXYGEN SATURATION: 97 % | SYSTOLIC BLOOD PRESSURE: 131 MMHG | DIASTOLIC BLOOD PRESSURE: 71 MMHG

## 2020-07-21 PROCEDURE — 74011250636 HC RX REV CODE- 250/636: Performed by: PHYSICAL MEDICINE & REHABILITATION

## 2020-07-21 PROCEDURE — 77030039433 HC TY MYLEOGRAM BD -B: Performed by: PHYSICAL MEDICINE & REHABILITATION

## 2020-07-21 PROCEDURE — 74011636320 HC RX REV CODE- 636/320: Performed by: PHYSICAL MEDICINE & REHABILITATION

## 2020-07-21 PROCEDURE — 76010000009 HC PAIN MGT 0 TO 30 MIN PROC: Performed by: PHYSICAL MEDICINE & REHABILITATION

## 2020-07-21 PROCEDURE — 74011000250 HC RX REV CODE- 250: Performed by: PHYSICAL MEDICINE & REHABILITATION

## 2020-07-21 RX ORDER — DEXAMETHASONE SODIUM PHOSPHATE 100 MG/10ML
INJECTION INTRAMUSCULAR; INTRAVENOUS AS NEEDED
Status: DISCONTINUED | OUTPATIENT
Start: 2020-07-21 | End: 2020-07-21 | Stop reason: HOSPADM

## 2020-07-21 RX ORDER — DIAZEPAM 5 MG/1
5-20 TABLET ORAL ONCE
Status: DISCONTINUED | OUTPATIENT
Start: 2020-07-21 | End: 2020-07-21 | Stop reason: HOSPADM

## 2020-07-21 RX ORDER — LIDOCAINE HYDROCHLORIDE 10 MG/ML
INJECTION, SOLUTION EPIDURAL; INFILTRATION; INTRACAUDAL; PERINEURAL AS NEEDED
Status: DISCONTINUED | OUTPATIENT
Start: 2020-07-21 | End: 2020-07-21 | Stop reason: HOSPADM

## 2020-07-21 NOTE — INTERVAL H&P NOTE
Update History & Physical    The Patient's History and Physical of July 7, 2020 was reviewed . There was no change. The surgical site was confirmed by the patient and me. Plan:  The risk, benefits, expected outcome, and alternative to the recommended procedure have been discussed with the patient. Patient understands and wants to proceed with the procedure.     Electronically signed by Misa Gorman MD on 7/21/2020 at 8:09 AM

## 2020-07-21 NOTE — DISCHARGE INSTRUCTIONS
[facility]   (MADELAINE)  Dr. Phoenix Fontanez, Dr. Daniele Hernandez, Dr. Misty Truong not drive a car, operate heavy machinery or dangerous equipment for 24 hours. * Activity as tolerated; rest for the remainder of the day. * Resume pre-block medications including those for your family doctor. * Do not drink alcoholic beverages for 24 hours. Alcohol and the medications you have received may interact and cause an adverse reaction. * You may feel better this evening and worse tomorrow, as the numbing medications wears off and the steroid has yet to begin to work. After 48 hrs the steroid should begin to release bringing you relief. * You may shower this evening and remove any bandages. * Avoid hot tubs and heating pads for 24 hours. You may use cold packs on the procedure site as tolerated for the first 24 hours. * If a headache develops, drink plenty of fluids and rest.  Take over the counter medications for headache if needed. If the headache continues longer than 24 hours, call MD at the [address] [place] [address]. [phone]    * Continue taking pain medications as needed. * You may resume your regular diet if tolerated. Otherwise, start with sips of water and advance slowly. * If Diabetic: check your blood sugar three times a day for the next 3 days. If your sugar is greater than 300 call your family doctor. If your sugar is greater than 400, have someone transport you to the nearest Emergency Room. * If you experience any of the following problems, Please Call the [address] [place] [address] at [phone].         * Shortness of Breath    * Fever of 101 or higher    * Nausea / Vomiting    * Severe Headache    * Weakness or numbness in arms or legs that is not      resolving    * Prolonged increase in pain greater than 4 days      DISCHARGE SUMMARY from Nurse      PATIENT INSTRUCTIONS:    After oral sedation, for 24 hours or while taking prescription Narcotics:  · Limit your activities  · Do not drive and operate hazardous machinery  · Do not make important personal or business decisions  · Do  not drink alcoholic beverages  · If you have not urinated within 8 hours after discharge, please contact your surgeon on call. Report the following to your surgeon:  · Excessive pain, swelling, redness or odor of or around the surgical area  · Temperature over 101  · Nausea and vomiting lasting longer than 4 hours or if unable to take medications  · Any signs of decreased circulation or nerve impairment to extremity: change in color, persistent  numbness, tingling, coldness or increase pain  · Any questions            What to do at Home:  Recommended activity: Activity as tolerated, NO DRIVING FOR 12 Hours post injection          *  Please give a list of your current medications to your Primary Care Provider. *  Please update this list whenever your medications are discontinued, doses are      changed, or new medications (including over-the-counter products) are added. *  Please carry medication information at all times in case of emergency situations. These are general instructions for a healthy lifestyle:    No smoking/ No tobacco products/ Avoid exposure to second hand smoke    Surgeon General's Warning:  Quitting smoking now greatly reduces serious risk to your health. Obesity, smoking, and sedentary lifestyle greatly increases your risk for illness    A healthy diet, regular physical exercise & weight monitoring are important for maintaining a healthy lifestyle    You may be retaining fluid if you have a history of heart failure or if you experience any of the following symptoms:  Weight gain of 3 pounds or more overnight or 5 pounds in a week, increased swelling in our hands or feet or shortness of breath while lying flat in bed.   Please call your doctor as soon as you notice any of these symptoms; do not wait until your next office visit. Recognize signs and symptoms of STROKE:    F-face looks uneven    A-arms unable to move or move unevenly    S-speech slurred or non-existent    T-time-call 911 as soon as signs and symptoms begin-DO NOT go       Back to bed or wait to see if you get better-TIME IS BRAIN.

## 2020-07-21 NOTE — PROCEDURES
Hip Joint Injection Procedure Note    Patient Name: Tushar Ernst  Date of Procedure: July 21, 2020  Preoperative Diagnosis: LEFT HIP PAIN  Post Operative Diagnosis: Same  Location:  [facility], [address]    Procedure: left Hip Intra-Articular Joint Injection    Consent: Informed consent was obtained prior to the procedure. The patient was given the opportunity to ask questions regarding the procedure and its associated risks. In addition to the potential risks associated with the procedure itself, the patient was informed both verbally and in writing of potential side effects of the use of glucocorticoids. The patient appeared to comprehend the informed consent and desired to have the procedure performed. .The patient was counseled at length about the risks of isidro Covid-19 during their perioperative period and any recovery window from their procedure. The patient was made aware that isidro Covid-19  may worsen their prognosis for recovering from their procedure and lend to a higher morbidity and/or mortality risk. All material risks, benefits, and reasonable alternatives including postponing the procedure were discussed. The patient does  wish to proceed with the procedure at this time. Procedure: The patient was placed in the supine position on the fluoroscopy table and the hip was prepped and draped in the usual sterile manner. Femoral vascular structures were identified. The hip joint was visualized using fluoroscopy, and after a local Lidocaine injection, a 22 gauge #5 spinal needle was advanced to the hip joint. YES 1 cc of Isovue M-200 was used to verify positioning of the needle in the hip capsule. No vascular uptake was identified. A total of 10 mg of preservative free Dexamethasone and 5 cc of Lidocaine was slowly injected. The patient tolerated the procedure well. The injection area was cleaned and bandaids applied.  No excessive bleeding was noted. Patient dressed and was discharged to home with instructions. Discussion:  The patient tolerated the procedure well.

## 2020-07-29 ENCOUNTER — HOSPITAL ENCOUNTER (OUTPATIENT)
Dept: PHYSICAL THERAPY | Age: 55
Discharge: HOME | End: 2020-07-29
Payer: COMMERCIAL

## 2020-07-29 PROCEDURE — 97110 THERAPEUTIC EXERCISES: CPT

## 2020-07-29 PROCEDURE — 97140 MANUAL THERAPY 1/> REGIONS: CPT

## 2020-07-29 NOTE — PROGRESS NOTES
PT DAILY TREATMENT NOTE 10-18    Patient Name: Modesto Becerra  Date:2020  : 1965  [x]  Patient  Verified  Payor: Preston Mishra / Plan: [employer] / Product Type: HMO /    In time:330  Out time:410  Total Treatment Time (min): 40  Visit #: 2 of 10    Treatment Area: Cervicalgia [M54.2]  Pain in right shoulder [M25.511]  Other cervical disc degeneration, unspecified cervical region [M50.30]  Other muscle spasm [M62.838]  Pain in thoracic spine [M54.6]    SUBJECTIVE  Pain Level (0-10 scale): 3/10  Any medication changes, allergies to medications, adverse drug reactions, diagnosis change, or new procedure performed?: [x] No    [] Yes (see summary sheet for update)  Subjective functional status/changes:   [] No changes reported  Pt stated that he is doing ok today and that the pain is not too bad    OBJECTIVE    30 min Therapeutic Exercise:  [x] See flow sheet :   Rationale: increase ROM and increase strength to improve the patients ability to increase ease with ADLs    10 min Manual Therapy:  DTM to right UT, Ardia Rain and pec minor   Rationale: decrease pain, increase ROM and increase tissue extensibility to increase ease with yard work    With   [x] TE   [] TA   [] neuro   [] other: Patient Education: [x] Review HEP    [] Progressed/Changed HEP based on:   [] positioning   [] body mechanics   [] transfers   [] heat/ice application    [] other:      Other Objective/Functional Measures:   Had no difficulty with exercises  No complaint of increased pain during session   Had significant tightness in right UT and Lev which partly resolved with manual     Pain Level (0-10 scale) post treatment: 0-1/10    ASSESSMENT/Changes in Function:   Initiated therex today per flow sheet. Pt reported compliance with HEP.  Pt put forth good effort with al exercisis    Patient will continue to benefit from skilled PT services to modify and progress therapeutic interventions, address functional mobility deficits, address ROM deficits, address strength deficits, analyze and address soft tissue restrictions, analyze and cue movement patterns, assess and modify postural abnormalities and instruct in home and community integration to attain remaining goals. [x]  See Plan of Care  []  See progress note/recertification  []  See Discharge Summary         Progress towards goals / Updated goals:  Short Term Goals: To be accomplished in 5 treatments:  1.  Pt will be compliant and independent with HEP in order to facilitate PT sessions and aid with self management            Goal met.  7/29/20    2.  Pt to tolerate 30 min or more of TE and/or Interventions w/o increased s/s             Eval Status:  Initiated             Current Status:     Long Term Goals: To be accomplished in 10 treatments:  1.  Pt will report 50% improvement or better with neck dysfunction to show a significant increase in ability to tolerate ADL             Eval Status:  Initiated             Current Status:  2.  Pt will demonstrate AROM right shoulder Flx/ABD to 140* with little discomfort              Eval Status:  Initiated             Current Status: AROM Right Shoulder Flx 139   3.  Pt will have decreased shoulder and neck pain at 3/10 or better when performing normal activity to complete usual household tasks                           Eval Status:  Initiated             Current Status:  4.  Pt will demonstrate little to no difficulty with treatment program to show increased tolerance for ability to return to riding motorcycle with little to no difficulty               Eval Status:  Initiated             Current Status:  5.  Pt will improve FOTO score to 65 in 10 visits to show significant improvement progress to good shoulder and neck function             Eval Status: 51 at Eval             Current Status:     PLAN  []  Upgrade activities as tolerated     [x]  Continue plan of care  []  Update interventions per flow sheet       []  Discharge due to:_  [] Other:_      Lyubov Martindy, PTA 7/29/2020  3:27 PM    Future Appointments   Date Time Provider Department Center   7/29/2020  3:30 PM [facility]   7/31/2020  3:30 PM [facility]   8/6/2020  8:45 AM Wally Jaime MD [facility]   9/1/2020  2:30 PM Jayme Rainey  [address]   1/7/2021  8:40 AM [facility] NURSE [facility]   1/14/2021  8:30 AM Mattie Sanders MD [facility]

## 2020-07-31 ENCOUNTER — HOSPITAL ENCOUNTER (OUTPATIENT)
Dept: PHYSICAL THERAPY | Age: 55
Discharge: HOME | End: 2020-07-31
Payer: COMMERCIAL

## 2020-07-31 PROCEDURE — 97110 THERAPEUTIC EXERCISES: CPT

## 2020-07-31 PROCEDURE — 97140 MANUAL THERAPY 1/> REGIONS: CPT

## 2020-07-31 NOTE — PROGRESS NOTES
PT DAILY TREATMENT NOTE 10-18    Patient Name: Any Christopher  Date:2020  : 1965  [x]  Patient  Verified  Payor: Ever Hodge / Plan: [address] [place] [address] [place] HMO / Product Type: HMO /    In time: 2:01 Out time: 2:59  Total Treatment Time (min): 58  Visit #: 3 of 10    Treatment Area: Cervicalgia [M54.2]  Pain in right shoulder [M25.511]  Other cervical disc degeneration, unspecified cervical region [M50.30]  Other muscle spasm [M62.838]  Pain in thoracic spine [M54.6]    SUBJECTIVE  Pain Level (0-10 scale): 2-3/10  Any medication changes, allergies to medications, adverse drug reactions, diagnosis change, or new procedure performed?: [x] No    [] Yes (see summary sheet for update)  Subjective functional status/changes:   [] No changes reported  Pt reports he is always in pain. He states tightness in his mid back and pain with raising up his arm. He notes difficulty turning his head as well.      OBJECTIVE    43 min Therapeutic Exercise:  [x] See flow sheet :   Rationale: increase ROM and increase strength to improve the patients ability to increase ease with ADLs    15 min Manual Therapy: t/s grade III-IV mobs; MET for ERSR C4-C7; TPR lower trap, latissimus dorsi and infraspinatus   Rationale: decrease pain, increase ROM and increase tissue extensibility to increase ease with yard work    With   [x] TE   [] TA   [] neuro   [] other: Patient Education: [x] Review HEP    [] Progressed/Changed HEP based on:   [] positioning   [] body mechanics   [] transfers   [] heat/ice application    [] other:      Other Objective/Functional Measures:   T/s hypomobility  Forward shoulder and head posture  Improved ease of c/s AROM rotation post MET  Upon attempt at upright posture had tenderness in lower trap; relieve with TPR  Anterior referral pain with TPR to infraspinatus  Educated on use of theracane for self TPR  Educated on pillow between legs for side sleeping  Decreased posterior shoulder strength    Pain Level (0-10 scale) post treatment: 0-1/10    ASSESSMENT/Changes in Function: Pt with good compliance of HEP. He has t/s hypomobility and forward shoulder posture. He has excessive UT use for right shoulder elevation with pain. He has decreased posterior kinetic chain strength for posture and correct shoulder mechanics. Will work to further improve posture, decrease pain and improve ease of right shoulder mobility for working and riding his motorcycle. Progress towards goals / Updated goals:  Short Term Goals: To be accomplished in 5 treatments:  1.  Pt will be compliant and independent with HEP in order to facilitate PT sessions and aid with self management            Goal met.  7/29/20  2.  Pt to tolerate 30 min or more of TE and/or Interventions w/o increased s/s             Eval Status:  Initiated             Current Status:   Long Term Goals: To be accomplished in 10 treatments:  1.  Pt will report 50% improvement or better with neck dysfunction to show a significant increase in ability to tolerate ADL             Eval Status:  Initiated             Current Status:  2.  Pt will demonstrate AROM right shoulder Flx/ABD to 140* with little discomfort              Eval Status:  Initiated             Current Status: AROM Right Shoulder Flx 139   3.  Pt will have decreased shoulder and neck pain at 3/10 or better when performing normal activity to complete usual household tasks                           Eval Status:  Initiated             Current Status:  4.  Pt will demonstrate little to no difficulty with treatment program to show increased tolerance for ability to return to riding motorcycle with little to no difficulty               Eval Status:  Initiated             Current Status:  5.  Pt will improve FOTO score to 65 in 10 visits to show significant improvement progress to good shoulder and neck function             Eval Status: 51 at Eval             Current Status:     PLAN  [x]  Upgrade activities as tolerated [x]  Continue plan of care  []  Update interventions per flow sheet       []  Discharge due to:_  []  Other:_      Danny Braxton PTA 7/31/2020  3:27 PM    Future Appointments   Date Time Provider [place] Negro   7/31/2020  2:00 PM [id number]   8/6/2020  8:45 AM Lisa Cardona MD [facility]   9/1/2020  2:30 PM Rina Logan  [address]   1/7/2021  8:40 AM [facility] NURSE [facility]   1/14/2021  8:30 AM MD Deshawn Abdalla [facility]

## 2020-08-04 ENCOUNTER — APPOINTMENT (OUTPATIENT)
Dept: PHYSICAL THERAPY | Age: 55
End: 2020-08-04
Payer: COMMERCIAL

## 2020-08-06 ENCOUNTER — HOSPITAL ENCOUNTER (OUTPATIENT)
Dept: PHYSICAL THERAPY | Age: 55
Discharge: HOME | End: 2020-08-06
Payer: COMMERCIAL

## 2020-08-06 PROCEDURE — 97110 THERAPEUTIC EXERCISES: CPT

## 2020-08-06 NOTE — PROGRESS NOTES
PT DAILY TREATMENT NOTE 10-18    Patient Name: Babak Sanchez  Date:2020  : 1965  [x]  Patient  Verified  Payor: Domitila Kimball / Plan: [address] [place] [employer] / Product Type: HMO /    In time: 3:45  Out time:4:30  Total Treatment Time (min): 45  Visit #: 4 of 10      Treatment Area: Cervicalgia [M54.2]  Pain in right shoulder [M25.511]  Other cervical disc degeneration, unspecified cervical region [M50.30]  Other muscle spasm [M62.838]  Pain in thoracic spine [M54.6]    SUBJECTIVE  Pain Level (0-10 scale): 4/10 c/s and 6/10 t/s  Any medication changes, allergies to medications, adverse drug reactions, diagnosis change, or new procedure performed?: [x] No    [] Yes (see summary sheet for update)  Subjective functional status/changes:   [] No changes reported  Patient reports he discontinued using cane because he thought it was causing his shoulder pain. \"I want to do a lot of things around the house, but the pain is keeping from doing it all. \"  He also states he is not finding much relief with manual therapy;    OBJECTIVE    45 min Therapeutic Exercise:  [] See flow sheet :   Rationale: increase ROM and increase strength to improve the patients ability to perform ADLs with ease, decrease pain, and improve posture          With   [x] TE   [] TA   [] neuro   [] other: Patient Education: [x] Review HEP    [] Progressed/Changed HEP based on:   [x] positioning   [x] body mechanics   [] transfers   [] heat/ice application    [] other:      Other Objective/Functional Measures:   Held manual therapy to assess outcome over the weekend due to report of no change in status  Verbal cuing needed for form and body mechanics with new and progressed therex  Modified pec stretch to target upper, mid, and lower aspects    Pain Level (0-10 scale) post treatment: 2-3     ASSESSMENT/Changes in Function: Patient continues to present with fluctuations in pain of the right shoulder, c/s, and t/s due to hypomobility; trigger points noted at bilateral rhomboid; postural deviation to include thoracic kyphosis due to pec tightness. Continued focus on postural corrections and posterior strengthening will benefit patient progress towards decreasing pain. Patient will continue to benefit from skilled PT services to modify and progress therapeutic interventions, address functional mobility deficits, address ROM deficits, address strength deficits, analyze and address soft tissue restrictions, analyze and cue movement patterns, analyze and modify body mechanics/ergonomics and assess and modify postural abnormalities to attain remaining goals. []  See Plan of Care  []  See progress note/recertification  []  See Discharge Summary         Progress towards goals / Updated goals:  Short Term Goals: To be accomplished in 5 treatments:  1.  Pt will be compliant and independent with HEP in order to facilitate PT sessions and aid with self management            Goal met.  7/29/20  2.  Pt to tolerate 30 min or more of TE and/or Interventions w/o increased s/s             Eval Status:  Initiated             Current Status: Progressing 8/6/2020  Long Term Goals: To be accomplished in 10 treatments:  1.  Pt will report 50% improvement or better with neck dysfunction to show a significant increase in ability to tolerate ADL             Eval Status:  Initiated             Current Status:  2.  Pt will demonstrate AROM right shoulder Flx/ABD to 140* with little discomfort              Eval Status:  Initiated             Current Status: AROM Right Shoulder Flx 139   3.  Pt will have decreased shoulder and neck pain at 3/10 or better when performing normal activity to complete usual household tasks                           Eval Status:  Initiated             Current Status:  4.  Pt will demonstrate little to no difficulty with treatment program to show increased tolerance for ability to return to riding motorcycle with little to no difficulty               Eval Status:  Initiated             Current Status:  5.  Pt will improve FOTO score to 65 in 10 visits to show significant improvement progress to good shoulder and neck function             Eval Status: 51 at Eval             Current Status:     PLAN  []  Upgrade activities as tolerated     []  Continue plan of care  []  Update interventions per flow sheet       []  Discharge due to:_  []  Other:_      AMY Figueroa 8/6/2020  3:54 PM    Future Appointments   Date Time Provider Marvin Negro   8/11/2020  2:15 PM Francisca Dhaliwal, PT [facility]   8/13/2020  3:00 PM Bell Friday [facility]   8/18/2020  2:15 PM Bell Friday [facility]   8/20/2020  3:45 PM David Barrera, PT [facility]   8/25/2020  3:45 PM Bellam Friday [facility]   8/27/2020  3:00 PM David Barrera, PT [facility]   9/1/2020  2:30 PM Benja Muñoz  [address] 1/7/2021  8:40 AM [facility]UR[facility]   1/14/2021  8:30 AM Noemy Collins MD [facility]

## 2020-08-11 ENCOUNTER — APPOINTMENT (OUTPATIENT)
Dept: PHYSICAL THERAPY | Age: 55
End: 2020-08-11
Payer: COMMERCIAL

## 2020-08-13 ENCOUNTER — APPOINTMENT (OUTPATIENT)
Dept: PHYSICAL THERAPY | Age: 55
End: 2020-08-13
Payer: COMMERCIAL

## 2020-08-18 ENCOUNTER — HOSPITAL ENCOUNTER (OUTPATIENT)
Dept: PHYSICAL THERAPY | Age: 55
Discharge: HOME | End: 2020-08-18
Payer: COMMERCIAL

## 2020-08-18 PROCEDURE — 97140 MANUAL THERAPY 1/> REGIONS: CPT

## 2020-08-18 PROCEDURE — 97110 THERAPEUTIC EXERCISES: CPT

## 2020-08-18 NOTE — PROGRESS NOTES
PT DAILY TREATMENT NOTE 10-18    Patient Name: Purvi De La O  Date:2020  : 1965  [x]  Patient  Verified  Payor: Americo Artist / Plan: [employer] / Product Type: HMO /    In time: 2:18  Out time:3:00  Total Treatment Time (min): 42  Visit #: 8 of 18    Treatment Area: Cervicalgia [M54.2]  Pain in right shoulder [M25.511]  Other cervical disc degeneration, unspecified cervical region [M50.30]  Other muscle spasm [M62.838]  Pain in thoracic spine [M54.6]    SUBJECTIVE  Pain Level (0-10 scale): 7/10 R shoulder; 5/10 back  Any medication changes, allergies to medications, adverse drug reactions, diagnosis change, or new procedure performed?: [x] No    [] Yes (see summary sheet for update)  Subjective functional status/changes:   [] No changes reported  \"My shoulder is worse today. I was cutting tree limbs yesterday, and it's just killing me today. \"    OBJECTIVE    32 min Therapeutic Exercise:  [x] See flow sheet :   Rationale: increase ROM and increase strength to improve the patients ability to perform ADLs with ease    10 min Manual Therapy:  GH mobs, TPR rhomboids, upper and middle traps, and supraspinatus    Rationale: decrease pain, increase ROM, increase tissue extensibility, decrease trigger points and increase postural awareness to to increase mobility and reduce restriction with ADLs            With   [x] TE   [] TA   [] neuro   [] other: Patient Education: [x] Review HEP    [] Progressed/Changed HEP based on:   [x] positioning   [x] body mechanics   [] transfers   [] heat/ice application    [] other:      Other Objective/Functional Measures:   Supine on foam roll for pectoral stretching, with pt reporting positive outcome       Pain Level (0-10 scale) post treatment: 3/10 overall    ASSESSMENT/Changes in Function: Patient continues to c/o increase in pain after strenuous activity at home.  Hypomobility of bilateral scapulas due to significant tightness of rhomboid and trapezius muscles, palpable trigger points along bilateral medial scapular borders; AROM good WNL. Pt may benefit from IASTM to these areas. Patient will continue to benefit from skilled PT services to modify and progress therapeutic interventions, address functional mobility deficits, address ROM deficits, address strength deficits, analyze and address soft tissue restrictions, analyze and cue movement patterns, analyze and modify body mechanics/ergonomics and assess and modify postural abnormalities to attain remaining goals. [x]  See Plan of Care  []  See progress note/recertification  []  See Discharge Summary         Progress towards goals / Updated goals:  Short Term Goals: To be accomplished in 5 treatments:  1.  Pt will be compliant and independent with HEP in order to facilitate PT sessions and aid with self management            Goal met.  7/29/20  2.  Pt to tolerate 30 min or more of TE and/or Interventions w/o increased s/s             Eval Status:  Initiated             Current Status: Progressing 8/6/2020  Long Term Goals: To be accomplished in 10 treatments:  1.  Pt will report 50% improvement or better with neck dysfunction to show a significant increase in ability to tolerate ADL             Eval Status:  Initiated             Current Status:  2.  Pt will demonstrate AROM right shoulder Flx/ABD to 140* with little discomfort              Eval Status:  Initiated             Current Status: AROM Right Shoulder Flx 139   3.  Pt will have decreased shoulder and neck pain at 3/10 or better when performing normal activity to complete usual household tasks                           Eval Status:  Initiated             Current Status:  4.  Pt will demonstrate little to no difficulty with treatment program to show increased tolerance for ability to return to riding motorcycle with little to no difficulty               Eval Status:  Initiated             Current Status:  5.  Pt will improve FOTO score to 65 in 10 visits to show significant improvement progress to good shoulder and neck function             Eval Status: 51 at Eval             Current Status:     PLAN  [x]  Upgrade activities as tolerated     []  Continue plan of care  []  Update interventions per flow sheet       []  Discharge due to:_  []  Other:_      AMY Lopez 8/18/2020  2:30 PM    Future Appointments   Date Time Provider Marvin Negro   8/20/2020  3:45 PM Gerardo Concepcion, PT [facility]   8/25/2020  3:45 PM Anson Padron [facility]   8/27/2020  3:00 PM Gerardo Concepcion, PT [facility]   9/1/2020  2:30 PM Karl Bowie  [address]   1/7/2021  8:40 AM [facility] NURSE [facility]   1/14/2021  8:30 AM MD Joanne Sims

## 2020-08-20 ENCOUNTER — HOSPITAL ENCOUNTER (OUTPATIENT)
Dept: PHYSICAL THERAPY | Age: 55
Discharge: HOME | End: 2020-08-20
Payer: COMMERCIAL

## 2020-08-20 PROCEDURE — 97140 MANUAL THERAPY 1/> REGIONS: CPT

## 2020-08-20 PROCEDURE — 97110 THERAPEUTIC EXERCISES: CPT

## 2020-08-20 NOTE — PROGRESS NOTES
[facility]  [address]  [phone] fax    Physical Therapy Progress Note  Patient name: Ag Brock Start of Care: 2020   Referral source: Toy Lawrence MD : 1965               Medical Diagnosis: Cervicalgia [M54.2]  Pain in right shoulder [M25.511]  Other cervical disc degeneration, unspecified cervical region [M50.30]  Other muscle spasm [M62.838]  Payor: Tj Rosas / Plan: [address] [employer] / Product Type: HMO /  Onset Date: 2 Years ago with recent exaccerbations on or about 20               Treatment Diagnosis: CS/TS muscle strain   Prior Hospitalization: see medical history Provider#: [id number]   Medications: Verified on Patient summary List   Comorbidities: Depression, OA, Alcohol Use, Tobacco Use, Scoliosis  Prior Level of Function: Able to ride motorcycle with no difficulty    Visits from Start of Care: 6    Missed Visits: 1    Established Goals:         Excellent           Good         Limited           None  [x] Increased ROM   []  [x]  []  []  [x] Increased Strength  []  [x]  []  []  [x] Increased Mobility  []  []  [x]  []   [x] Decreased Pain   []  []  [x]  []  [] Decreased Swelling  []  []  []  []    Key Functional Changes:  Pt. Is progressing slowly with physical therapy. He continues to have most pain in right shoulder. He demonstrates improving right shoulder AROM flex: 151 degrees abd: 115 degrees. Cervical rotation AROM to right is 56 degrees with pain at end range. FOTO score has no significant change neck: 51 degrees shoulder: 57 degrees. He has muscle tightness and multiple trigger points along right UT, cervical paraspinals, and thoracic paraspinals. Skilled PT is medically necessary in order to improve strength and mobility for increased ease of ADLs and improved quality of life. Updated Goals: to be achieved in 4 weeks:  1.  Patient will improve cervical FOTO score by 14 points in order to demonstrate a significant improvement in function. 2. Patient will improve right shoulder abduction AROM to 135 degrees in order to increase ease of ADLs. 3. Patient will report pain at 3/10 or less during ADLS in order to improve quality of life. 4. Patient will report a 50% improvement in symptoms since [facility] in order to increase ease of ADLs. ASSESSMENT/RECOMMENDATIONS:  [x]Continue therapy per initial plan/protocol at a frequency of  2 x per week for 4 weeks  []Continue therapy with the following recommended changes:_____________________      _____________________________________________________________________  []Discontinue therapy progressing towards or have reached established goals  []Discontinue therapy due to lack of appreciable progress towards goals  []Discontinue therapy due to lack of attendance or compliance  []Await Physician's recommendations/decisions regarding therapy  []Other:________________________________________________________________    Thank you for this referral.   Soy Pardo, PT 8/20/2020 5:27 PM    NOTE TO PHYSICIAN:  [address]   FAX TO [facility]: [phone]  If you are unable to process this request in 24 hours please contact our office: [phone] I have read the above report and request that my patient continue as recommended. ? I have read the above report and request that my patient continue therapy with the following changes/special instructions:____________________________________  ? I have read the above report and request that my patient be discharged from therapy.     Physicians signature: ______________________________Date: ______Time:______

## 2020-08-20 NOTE — PROGRESS NOTES
PT DAILY TREATMENT NOTE 10-18    Patient Name: Suzan Banegas  Date:2020  : 1965  [x]  Patient  Verified  Payor: Chaparro Weller / Plan: [employer] / Product Type: HMO /    In time: 3:50  Out time: 4:39  Total Treatment Time (min): 49  Visit #: 6  10    Treatment Area: Cervicalgia [M54.2]  Pain in right shoulder [M25.511]  Other cervical disc degeneration, unspecified cervical region [M50.30]  Other muscle spasm [M62.838]  Pain in thoracic spine [M54.6]    SUBJECTIVE  Pain Level (0-10 scale):  6/10  Any medication changes, allergies to medications, adverse drug reactions, diagnosis change, or new procedure performed?: [x] No    [] Yes (see summary sheet for update)  Subjective functional status/changes:   [] No changes reported  Pt.  Reports he feels like overall he is improving and feeling a little stronger     OBJECTIVE    34 min Therapeutic Exercise:  [x] See flow sheet :   Rationale: increase ROM and increase strength to improve the patients ability to increase ease of ADLs    15 min Manual Therapy:  Trigger point release and STM to right UT, levator scap, cervical paraspinals and thoracic paraspinals   Rationale: decrease pain, increase ROM and increase tissue extensibility to increase ease of ADLs          With   [x] TE   [] TA   [] neuro   [] other: Patient Education: [x] Review HEP    [] Progressed/Changed HEP based on:   [] positioning   [] body mechanics   [] transfers   [] heat/ice application    [] other:      Other Objective/Functional Measures:   Right shoulder AROM: flex: 151 abd: 115  Cervical rotation to right: 56 degrees  FOTO: neck: 51 shoulder: 57    Pain Level (0-10 scale) post treatment: 0/10    ASSESSMENT/Changes in Function:      []  See Plan of Care  [x]  See progress note/recertification  []  See Discharge Summary         Progress towards goals / Updated goals:  See progress note    PLAN  []  Upgrade activities as tolerated     [x]  Continue plan of care  []  Update interventions per flow sheet       []  Discharge due to:_  []  Other:_      Priscila Das, PT 8/20/2020  8:08 AM    Future Appointments   Date Time Provider Marvin Sruthi   8/20/2020  3:45 PM Khushi Sorto [facility]   8/25/2020  3:45 PM Ede ERWIN [facility]   8/27/2020  3:00 PM Alf Vega PT [facility]   9/1/2020  2:30 PM Jeremy Mann  [address]   1/7/2021  8:40 AM [facility] NURSE [facility]   1/14/2021  8:30 AM Shin Bermeo MD [facility]

## 2020-08-25 ENCOUNTER — APPOINTMENT (OUTPATIENT)
Dept: PHYSICAL THERAPY | Age: 55
End: 2020-08-25
Payer: COMMERCIAL

## 2020-09-08 ENCOUNTER — OFFICE VISIT (OUTPATIENT)
Dept: ORTHOPEDIC SURGERY | Age: 55
End: 2020-09-08

## 2020-09-08 VITALS
DIASTOLIC BLOOD PRESSURE: 64 MMHG | HEIGHT: 67 IN | HEART RATE: 63 BPM | RESPIRATION RATE: 14 BRPM | OXYGEN SATURATION: 98 % | BODY MASS INDEX: 28.28 KG/M2 | SYSTOLIC BLOOD PRESSURE: 135 MMHG | WEIGHT: 180.2 LBS | TEMPERATURE: 97.7 F

## 2020-09-08 DIAGNOSIS — R29.898 RIGHT ARM WEAKNESS: Primary | ICD-10-CM

## 2020-09-08 DIAGNOSIS — M50.30 DDD (DEGENERATIVE DISC DISEASE), CERVICAL: ICD-10-CM

## 2020-09-08 DIAGNOSIS — M62.838 MUSCLE SPASM: ICD-10-CM

## 2020-09-08 DIAGNOSIS — R29.2 HYPERREFLEXIA: ICD-10-CM

## 2020-09-08 DIAGNOSIS — M54.6 ACUTE MIDLINE THORACIC BACK PAIN: ICD-10-CM

## 2020-09-08 DIAGNOSIS — M16.12 PRIMARY OSTEOARTHRITIS OF LEFT HIP: ICD-10-CM

## 2020-09-08 DIAGNOSIS — Z72.0 TOBACCO USE: ICD-10-CM

## 2020-09-08 DIAGNOSIS — M25.511 CHRONIC RIGHT SHOULDER PAIN: ICD-10-CM

## 2020-09-08 DIAGNOSIS — G89.29 CHRONIC RIGHT SHOULDER PAIN: ICD-10-CM

## 2020-09-08 RX ORDER — CELECOXIB 200 MG/1
200 CAPSULE ORAL 2 TIMES DAILY WITH MEALS
Qty: 60 CAP | Refills: 0 | Status: CANCELLED | OUTPATIENT
Start: 2020-09-08

## 2020-09-08 RX ORDER — METHYLPREDNISOLONE 4 MG/1
TABLET ORAL
Qty: 1 DOSE PACK | Refills: 0 | Status: SHIPPED | OUTPATIENT
Start: 2020-09-08 | End: 2020-10-05 | Stop reason: ALTCHOICE

## 2020-09-08 RX ORDER — CELECOXIB 200 MG/1
CAPSULE ORAL
Qty: 60 CAP | Refills: 1 | Status: SHIPPED | OUTPATIENT
Start: 2020-09-08 | End: 2021-05-28

## 2020-09-08 NOTE — LETTER
9/11/20 Patient: Benitez Raphael YOB: 1965 Date of Visit: 9/8/2020 Francisco Sinha MD 
[address] [address] VIA Facsimile: [phone] Dear Francisco Sinha MD, Thank you for referring Mr. Marcio Acosta to [facility] for evaluation. My notes for this consultation are attached. If you have questions, please do not hesitate to call me. I look forward to following your patient along with you. Sincerely, Ara Thomas MD

## 2020-09-08 NOTE — PATIENT INSTRUCTIONS
Neck Arthritis: Exercises Introduction Here are some examples of exercises for you to try. The exercises may be suggested for a condition or for rehabilitation. Start each exercise slowly. Ease off the exercises if you start to have pain. You will be told when to start these exercises and which ones will work best for you. How to do the exercises Neck stretches to the side 1. This stretch works best if you keep your shoulder down as you lean away from it. To help you remember to do this, start by relaxing your shoulders and lightly holding on to your thighs or your chair. 2. Tilt your head toward your shoulder and hold for 15 to 30 seconds. Let the weight of your head stretch your muscles. 3. Repeat 2 to 4 times toward each shoulder. Chin tuck 1. Lie on the floor with a rolled-up towel under your neck. Your head should be touching the floor. 2. Slowly bring your chin toward your chest. 
3. Hold for a count of 6, and then relax for up to 10 seconds. 4. Repeat 8 to 12 times. Active cervical rotation 1. Sit in a firm chair, or stand up straight. 2. Keeping your chin level, turn your head to the right, and hold for 15 to 30 seconds. 3. Turn your head to the left and hold for 15 to 30 seconds. 4. Repeat 2 to 4 times to each side. Shoulder blade squeeze 1. While standing, squeeze your shoulder blades together. 2. Do not raise your shoulders up as you are squeezing. 3. Hold for 6 seconds. 4. Repeat 8 to 12 times. Shoulder rolls 1. Sit comfortably with your feet shoulder-width apart. You can also do this exercise standing up. 2. Roll your shoulders up, then back, and then down in a smooth, circular motion. 3. Repeat 2 to 4 times. Follow-up care is a key part of your treatment and safety. Be sure to make and go to all appointments, and call your doctor if you are having problems. It's also a good idea to know your test results and keep a list of the medicines you take. Where can you learn more? Go to http://real-amanda.info/ Enter W603 in the search box to learn more about \"Neck Arthritis: Exercises. \" Current as of: March 2, 2020               Content Version: 12.6 © 2006-2020 [employer]. Care instructions adapted under license by [employer] (which disclaims liability or warranty for this information). If you have questions about a medical condition or this instruction, always ask your healthcare professional. [address] any warranty or liability for your use of this information. Low Back Arthritis: Exercises Introduction Here are some examples of typical rehabilitation exercises for your condition. Start each exercise slowly. Ease off the exercise if you start to have pain. Your doctor or physical therapist will tell you when you can start these exercises and which ones will work best for you. When you are not being active, find a comfortable position for rest. Some people are comfortable on the floor or a medium-firm bed with a small pillow under their head and another under their knees. Some people prefer to lie on their side with a pillow between their knees. Don't stay in one position for too long. Take short walks (10 to 20 minutes) every 2 to 3 hours. Avoid slopes, hills, and stairs until you feel better. Walk only distances you can manage without pain, especially leg pain. How to do the exercises Pelvic tilt 4. Lie on your back with your knees bent. 5. \"Brace\" your stomachtighten your muscles by pulling in and imagining your belly button moving toward your spine. 6. Press your lower back into the floor. You should feel your hips and pelvis rock back. 7. Hold for 6 seconds while breathing smoothly. 8. Relax and allow your pelvis and hips to rock forward. 9. Repeat 8 to 12 times. Back stretches 5. Get down on your hands and knees on the floor. 6. Relax your head and allow it to droop. Round your back up toward the ceiling until you feel a nice stretch in your upper, middle, and lower back. Hold this stretch for as long as it feels comfortable, or about 15 to 30 seconds. 7. Return to the starting position with a flat back while you are on your hands and knees. 8. Let your back sway by pressing your stomach toward the floor. Lift your buttocks toward the ceiling. 9. Hold this position for 15 to 30 seconds. 10. Repeat 2 to 4 times. Follow-up care is a key part of your treatment and safety. Be sure to make and go to all appointments, and call your doctor if you are having problems. It's also a good idea to know your test results and keep a list of the medicines you take. Where can you learn more? Go to http://real-amanda.info/ Enter K093 in the search box to learn more about \"Low Back Arthritis: Exercises. \" Current as of: March 2, 2020               Content Version: 12.6 © 6085-1668 [email or web address], [employer]. Care instructions adapted under license by [employer] (which disclaims liability or warranty for this information). If you have questions about a medical condition or this instruction, always ask your healthcare professional. [address] any warranty or liability for your use of this information. Learning About Benefits From Quitting Smoking How does quitting smoking make you healthier? If you're thinking about quitting smoking, you may have a few reasons to be smoke-free. Your health may be one of them. · When you quit smoking, you lower your risks for cancer, lung disease, heart attack, stroke, blood vessel disease, and blindness from macular degeneration. · When you're smoke-free, you get sick less often, and you heal faster. You are less likely to get colds, flu, bronchitis, and pneumonia.  
· As a nonsmoker, you may find that your mood is better and you are less stressed. When and how will you feel healthier? Quitting has real health benefits that start from day 1 of being smoke-free. And the longer you stay smoke-free, the healthier you get and the better you feel. The first hours · After just 20 minutes, your blood pressure and heart rate go down. That means there's less stress on your heart and blood vessels. · Within 12 hours, the level of carbon monoxide in your blood drops back to normal. That makes room for more oxygen. With more oxygen in your body, you may notice that you have more energy than when you smoked. After 2 weeks · Your lungs start to work better. · Your risk of heart attack starts to drop. After 1 month · When your lungs are clear, you cough less and breathe deeper, so it's easier to be active. · Your sense of taste and smell return. That means you can enjoy food more than you have since you started smoking. Over the years · Over the years, your risks of heart disease, heart attack, and stroke are lower. · After 10 years, your risk of dying from lung cancer is cut by about half. And your risk for many other types of cancer is lower too. How would quitting help others in your life? When you quit smoking, you improve the health of everyone who now breathes in your smoke. · Their heart, lung, and cancer risks drop, much like yours. · They are sick less. For babies and small children, living smoke-free means they're less likely to have ear infections, pneumonia, and bronchitis. · If you're a woman who is or will be pregnant someday, quitting smoking means a healthier . · Children who are close to you are less likely to become adult smokers. Where can you learn more? Go to http://real-amanda.info/ Enter [id number] [id number] in the search box to learn more about \"Learning About Benefits From Quitting Smoking. \" Current as of: 2020               Content Version: 12.6 © 9856-2667 [employer]. Care instructions adapted under license by [employer] (which disclaims liability or warranty for this information). If you have questions about a medical condition or this instruction, always ask your healthcare professional. [address] any warranty or liability for your use of this information.

## 2020-09-08 NOTE — PROGRESS NOTES
Tayler Bowman presents today for   Chief Complaint   Patient presents with    Shoulder Pain     right    Back Pain     mid to lower    Follow-up       Is someone accompanying this pt? no    Is the patient using any DME equipment during OV? no    Depression Screening:  3 most recent PHQ Screens 9/8/2020   PHQ Not Done -   Little interest or pleasure in doing things Not at all   Feeling down, depressed, irritable, or hopeless Not at all   Total Score PHQ 2 0       Learning Assessment:  Learning Assessment 6/19/2017   PRIMARY LEARNER Patient   PRIMARY LANGUAGE ENGLISH   LEARNER PREFERENCE PRIMARY LISTENING   ANSWERED BY patient   RELATIONSHIP SELF       Abuse Screening:  Abuse Screening Questionnaire 9/8/2020   Do you ever feel afraid of your partner? N   Are you in a relationship with someone who physically or mentally threatens you? N   Is it safe for you to go home? Y       OPIOID RISK TOOL  Opioid Risk Tool 2/8/2018   Personal history of alcohol abuse? 0   Personal history of illegal drug abuse? 0   Personal history of prescription drug abuse? 0   Age range between 17-45? 0   History of preadolescent sexual abuse? 0   ADD, OCD, bipolar, schizophrenia? 0   Depression? 0   Family history of alcohol abuse? 0   Family history of illegal drug abuse? 0   Family history of prescription drug abuse? 0   Opioid Risk Tool Total Score 0         Pt currently taking Antiplatelet therapy? no    Coordination of Care:  1. Have you been to the ER, urgent care clinic since your last visit? no  Hospitalized since your last visit? no    2. Have you seen or consulted any other health care providers outside of the [address] [place] [address] since your last visit? no Include any pap smears or colon screening.  no

## 2020-09-24 ENCOUNTER — HOSPITAL ENCOUNTER (OUTPATIENT)
Age: 55
Discharge: HOME | End: 2020-09-24
Attending: PHYSICAL MEDICINE & REHABILITATION
Payer: COMMERCIAL

## 2020-09-24 DIAGNOSIS — M62.838 MUSCLE SPASM: ICD-10-CM

## 2020-09-24 DIAGNOSIS — M50.30 DDD (DEGENERATIVE DISC DISEASE), CERVICAL: ICD-10-CM

## 2020-09-24 DIAGNOSIS — R29.2 HYPERREFLEXIA: ICD-10-CM

## 2020-09-24 DIAGNOSIS — R29.898 RIGHT ARM WEAKNESS: ICD-10-CM

## 2020-09-24 PROCEDURE — 72141 MRI NECK SPINE W/O DYE: CPT

## 2020-10-05 ENCOUNTER — OFFICE VISIT (OUTPATIENT)
Dept: ORTHOPEDIC SURGERY | Age: 55
End: 2020-10-05
Payer: COMMERCIAL

## 2020-10-05 VITALS
OXYGEN SATURATION: 98 % | HEIGHT: 67 IN | WEIGHT: 182.6 LBS | HEART RATE: 60 BPM | BODY MASS INDEX: 28.66 KG/M2 | SYSTOLIC BLOOD PRESSURE: 137 MMHG | RESPIRATION RATE: 14 BRPM | DIASTOLIC BLOOD PRESSURE: 76 MMHG | TEMPERATURE: 97.7 F

## 2020-10-05 DIAGNOSIS — M48.061 SPINAL STENOSIS OF LUMBAR REGION WITHOUT NEUROGENIC CLAUDICATION: ICD-10-CM

## 2020-10-05 DIAGNOSIS — R29.898 RIGHT ARM WEAKNESS: ICD-10-CM

## 2020-10-05 DIAGNOSIS — G89.29 CHRONIC RIGHT SHOULDER PAIN: ICD-10-CM

## 2020-10-05 DIAGNOSIS — M47.812 CERVICAL FACET JOINT SYNDROME: Primary | ICD-10-CM

## 2020-10-05 DIAGNOSIS — M25.511 CHRONIC RIGHT SHOULDER PAIN: ICD-10-CM

## 2020-10-05 DIAGNOSIS — Z72.0 TOBACCO USE: ICD-10-CM

## 2020-10-05 DIAGNOSIS — M48.02 FORAMINAL STENOSIS OF CERVICAL REGION: ICD-10-CM

## 2020-10-05 PROCEDURE — 99214 OFFICE O/P EST MOD 30 MIN: CPT | Performed by: PHYSICAL MEDICINE & REHABILITATION

## 2020-10-05 RX ORDER — PREGABALIN 50 MG/1
CAPSULE ORAL
Qty: 60 CAP | Refills: 1 | Status: SHIPPED | OUTPATIENT
Start: 2020-10-05 | End: 2021-05-28

## 2020-10-05 NOTE — PATIENT INSTRUCTIONS
Neck Arthritis: Exercises Introduction Here are some examples of exercises for you to try. The exercises may be suggested for a condition or for rehabilitation. Start each exercise slowly. Ease off the exercises if you start to have pain. You will be told when to start these exercises and which ones will work best for you. How to do the exercises Neck stretches to the side 1. This stretch works best if you keep your shoulder down as you lean away from it. To help you remember to do this, start by relaxing your shoulders and lightly holding on to your thighs or your chair. 2. Tilt your head toward your shoulder and hold for 15 to 30 seconds. Let the weight of your head stretch your muscles. 3. Repeat 2 to 4 times toward each shoulder. Chin tuck 1. Lie on the floor with a rolled-up towel under your neck. Your head should be touching the floor. 2. Slowly bring your chin toward your chest. 
3. Hold for a count of 6, and then relax for up to 10 seconds. 4. Repeat 8 to 12 times. Active cervical rotation 1. Sit in a firm chair, or stand up straight. 2. Keeping your chin level, turn your head to the right, and hold for 15 to 30 seconds. 3. Turn your head to the left and hold for 15 to 30 seconds. 4. Repeat 2 to 4 times to each side. Shoulder blade squeeze 1. While standing, squeeze your shoulder blades together. 2. Do not raise your shoulders up as you are squeezing. 3. Hold for 6 seconds. 4. Repeat 8 to 12 times. Shoulder rolls 1. Sit comfortably with your feet shoulder-width apart. You can also do this exercise standing up. 2. Roll your shoulders up, then back, and then down in a smooth, circular motion. 3. Repeat 2 to 4 times. Follow-up care is a key part of your treatment and safety. Be sure to make and go to all appointments, and call your doctor if you are having problems. It's also a good idea to know your test results and keep a list of the medicines you take. Where can you learn more? Go to http://www.gray.com/ Enter I186 in the search box to learn more about \"Neck Arthritis: Exercises. \" Current as of: March 2, 2020               Content Version: 12.6 © 9579-3543 [employer]. Care instructions adapted under license by [employer] (which disclaims liability or warranty for this information). If you have questions about a medical condition or this instruction, always ask your healthcare professional. [address] any warranty or liability for your use of this information. Rotator Cuff: Exercises Introduction Here are some examples of exercises for you to try. The exercises may be suggested for a condition or for rehabilitation. Start each exercise slowly. Ease off the exercises if you start to have pain. You will be told when to start these exercises and which ones will work best for you. How to do the exercises Pendulum swing If you have pain in your back, do not do this exercise. 4. Hold on to a table or the back of a chair with your good arm. Then bend forward a little and let your sore arm hang straight down. This exercise does not use the arm muscles. Rather, use your legs and your hips to create movement that makes your arm swing freely. 5. Use the movement from your hips and legs to guide the slightly swinging arm back and forth like a pendulum (or elephant trunk). Then guide it in circles that start small (about the size of a dinner plate). Make the circles a bit larger each day, as your pain allows. 6. Do this exercise for 5 minutes, 5 to 7 times each day. 7. As you have less pain, try bending over a little farther to do this exercise. This will increase the amount of movement at your shoulder. Posterior stretching exercise 5. Hold the elbow of your injured arm with your other hand. 6. Use your hand to pull your injured arm gently up and across your body. You will feel a gentle stretch across the back of your injured shoulder. 7. Hold for at least 15 to 30 seconds. Then slowly lower your arm. 8. Repeat 2 to 4 times. Up-the-back stretch Your doctor or physical therapist may want you to wait to do this stretch until you have regained most of your range of motion and strength. You can do this stretch in different ways. Hold any of these stretches for at least 15 to 30 seconds. Repeat them 2 to 4 times. 5. Light stretch: Put your hand in your back pocket. Let it rest there to stretch your shoulder. 6. Moderate stretch: With your other hand, hold your injured arm (palm outward) behind your back by the wrist. Pull your arm up gently to stretch your shoulder. 7. Advanced stretch: Put a towel over your other shoulder. Put the hand of your injured arm behind your back. Now hold the back end of the towel. With the other hand, hold the front end of the towel in front of your body. Pull gently on the front end of the towel. This will bring your hand farther up your back to stretch your shoulder. Overhead stretch 5. Standing about an arm's length away, grasp onto a solid surface. You could use a countertop, a doorknob, or the back of a sturdy chair. 6. With your knees slightly bent, bend forward with your arms straight. Lower your upper body, and let your shoulders stretch. 7. As your shoulders are able to stretch farther, you may need to take a step or two backward. 8. Hold for at least 15 to 30 seconds. Then stand up and relax. If you had stepped back during your stretch, step forward so you can keep your hands on the solid surface. 9. Repeat 2 to 4 times. Shoulder flexion (lying down) To make a wand for this exercise, use a piece of PVC pipe or a broom handle with the broom removed. Make the wand about a foot wider than your shoulders. 4. Lie on your back, holding a wand with both hands. Your palms should face down as you hold the wand. 5. Keeping your elbows straight, slowly raise your arms over your head. Raise them until you feel a stretch in your shoulders, upper back, and chest. 
6. Hold for 15 to 30 seconds. 7. Repeat 2 to 4 times. Shoulder rotation (lying down) To make a wand for this exercise, use a piece of PVC pipe or a broom handle with the broom removed. Make the wand about a foot wider than your shoulders. 1. Lie on your back. Hold a wand with both hands with your elbows bent and palms up. 2. Keep your elbows close to your body, and move the wand across your body toward the sore arm. 3. Hold for 8 to 12 seconds. 4. Repeat 2 to 4 times. Wall climbing (to the side) Avoid any movement that is straight to your side, and be careful not to arch your back. Your arm should stay about 30 degrees to the front of your side. 1. Stand with your side to a wall so that your fingers can just touch it at an angle about 30 degrees toward the front of your body. 2. Walk the fingers of your injured arm up the wall as high as pain permits. Try not to shrug your shoulder up toward your ear as you move your arm up. 3. Hold that position for a count of at least 15 to 20. 
4. Walk your fingers back down to the starting position. 5. Repeat at least 2 to 4 times. Try to reach higher each time. Wall climbing (to the front) During this stretching exercise, be careful not to arch your back. 1. Face a wall, and stand so your fingers can just touch it. 2. Keeping your shoulder down, walk the fingers of your injured arm up the wall as high as pain permits. (Don't shrug your shoulder up toward your ear.) 3. Hold your arm in that position for at least 15 to 30 seconds. 4. Slowly walk your fingers back down to where you started. 5. Repeat at least 2 to 4 times. Try to reach higher each time. Shoulder blade squeeze 1.  Stand with your arms at your sides, and squeeze your shoulder blades together. Do not raise your shoulders up as you squeeze. 2. Hold 6 seconds. 3. Repeat 8 to 12 times. Scapular exercise: Arm reach 1. Lie flat on your back. This exercise is a very slight motion that starts with your arms raised (elbows straight, arms straight). 2. From this position, reach higher toward the ru or ceiling. Keep your elbows straight. All motion should be from your shoulder blade only. 3. Relax your arms back to where you started. 4. Repeat 8 to 12 times. Arm raise to the side During this strengthening exercise, your arm should stay about 30 degrees to the front of your side. 1. Slowly raise your injured arm to the side, with your thumb facing up. Raise your arm 60 degrees at the most (shoulder level is 90 degrees). 2. Hold the position for 3 to 5 seconds. Then lower your arm back to your side. If you need to, bring your \"good\" arm across your body and place it under the elbow as you lower your injured arm. Use your good arm to keep your injured arm from dropping down too fast. 
3. Repeat 8 to 12 times. 4. When you first start out, don't hold any extra weight in your hand. As you get stronger, you may use a 1-pound to 2-pound dumbbell or a small can of food. Shoulder flexor and extensor exercise These are isometric exercises. That means you contract your muscles without actually moving. 1. Push forward (flex): Stand facing a wall or doorjamb, about 6 inches or less back. Hold your injured arm against your body. Make a closed fist with your thumb on top. Then gently push your hand forward into the wall with about 25% to 50% of your strength. Don't let your body move backward as you push. Hold for about 6 seconds. Relax for a few seconds. Repeat 8 to 12 times. 2. Push backward (extend): Stand with your back flat against a wall. Your upper arm should be against the wall, with your elbow bent 90 degrees (your hand straight ahead).  Push your elbow gently back against the wall with about 25% to 50% of your strength. Don't let your body move forward as you push. Hold for about 6 seconds. Relax for a few seconds. Repeat 8 to 12 times. Scapular exercise: Wall push-ups This exercise is best done with your fingers somewhat turned out, rather than straight up and down. 1. Stand facing a wall, about 12 inches to 18 inches away. 2. Place your hands on the wall at shoulder height. 3. Slowly bend your elbows and bring your face to the wall. Keep your back and hips straight. 4. Push back to where you started. 5. Repeat 8 to 12 times. 6. When you can do this exercise against a wall comfortably, you can try it against a counter. You can then slowly progress to the end of a couch, then to a sturdy chair, and finally to the floor. Scapular exercise: Retraction For this exercise, you will need elastic exercise material, such as surgical tubing or Thera-Band. 1. Put the band around a solid object at about waist level. (A bedpost will work well.) Each hand should hold an end of the band. 2. With your elbows at your sides and bent to 90 degrees, pull the band back. Your shoulder blades should move toward each other. Then move your arms back where you started. 3. Repeat 8 to 12 times. 4. If you have good range of motion in your shoulders, try this exercise with your arms lifted out to the sides. Keep your elbows at a 90-degree angle. Raise the elastic band up to about shoulder level. Pull the band back to move your shoulder blades toward each other. Then move your arms back where you started. Internal rotator strengthening exercise 1. Start by tying a piece of elastic exercise material to a doorknob. You can use surgical tubing or Thera-Band. 2. Stand or sit with your shoulder relaxed and your elbow bent 90 degrees. Your upper arm should rest comfortably against your side. Squeeze a rolled towel between your elbow and your body for comfort.  This will help keep your arm at your side. 3. Hold one end of the elastic band in the hand of the painful arm. 4. Slowly rotate your forearm toward your body until it touches your belly. Slowly move it back to where you started. 5. Keep your elbow and upper arm firmly tucked against the towel roll or at your side. 6. Repeat 8 to 12 times. External rotator strengthening exercise 1. Start by tying a piece of elastic exercise material to a doorknob. You can use surgical tubing or Thera-Band. (You may also hold one end of the band in each hand.) 2. Stand or sit with your shoulder relaxed and your elbow bent 90 degrees. Your upper arm should rest comfortably against your side. Squeeze a rolled towel between your elbow and your body for comfort. This will help keep your arm at your side. 3. Hold one end of the elastic band with the hand of the painful arm. 4. Start with your forearm across your belly. Slowly rotate the forearm out away from your body. Keep your elbow and upper arm tucked against the towel roll or the side of your body until you begin to feel tightness in your shoulder. Slowly move your arm back to where you started. 5. Repeat 8 to 12 times. Follow-up care is a key part of your treatment and safety. Be sure to make and go to all appointments, and call your doctor if you are having problems. It's also a good idea to know your test results and keep a list of the medicines you take. Where can you learn more? Go to http://www.[employer].com/ Enter Ashly Issa in the search box to learn more about \"Rotator Cuff: Exercises. \" Current as of: March 2, 2020               Content Version: 12.6 © 0460-6511 [employer]. Care instructions adapted under license by [employer] (which disclaims liability or warranty for this information).  If you have questions about a medical condition or this instruction, always ask your healthcare professional. [address] any warranty or liability for your use of this information. Low Back Arthritis: Exercises Introduction Here are some examples of typical rehabilitation exercises for your condition. Start each exercise slowly. Ease off the exercise if you start to have pain. Your doctor or physical therapist will tell you when you can start these exercises and which ones will work best for you. When you are not being active, find a comfortable position for rest. Some people are comfortable on the floor or a medium-firm bed with a small pillow under their head and another under their knees. Some people prefer to lie on their side with a pillow between their knees. Don't stay in one position for too long. Take short walks (10 to 20 minutes) every 2 to 3 hours. Avoid slopes, hills, and stairs until you feel better. Walk only distances you can manage without pain, especially leg pain. How to do the exercises Pelvic tilt 8. Lie on your back with your knees bent. 9. \"Brace\" your stomachtighten your muscles by pulling in and imagining your belly button moving toward your spine. 10. Press your lower back into the floor. You should feel your hips and pelvis rock back. 11. Hold for 6 seconds while breathing smoothly. 12. Relax and allow your pelvis and hips to rock forward. 13. Repeat 8 to 12 times. Back stretches 9. Get down on your hands and knees on the floor. 10. Relax your head and allow it to droop. Round your back up toward the ceiling until you feel a nice stretch in your upper, middle, and lower back. Hold this stretch for as long as it feels comfortable, or about 15 to 30 seconds. 11. Return to the starting position with a flat back while you are on your hands and knees. 12. Let your back sway by pressing your stomach toward the floor. Lift your buttocks toward the ceiling. 13. Hold this position for 15 to 30 seconds. 14. Repeat 2 to 4 times. Follow-up care is a key part of your treatment and safety. Be sure to make and go to all appointments, and call your doctor if you are having problems. It's also a good idea to know your test results and keep a list of the medicines you take. Where can you learn more? Go to http://www.gray.com/ Enter Z300 in the search box to learn more about \"Low Back Arthritis: Exercises. \" Current as of: March 2, 2020               Content Version: 12.6 © 5738-1848 "[employer]. Care instructions adapted under license by [employer] (which disclaims liability or warranty for this information). If you have questions about a medical condition or this instruction, always ask your healthcare professional. [address] any warranty or liability for your use of this information.

## 2020-10-05 NOTE — PROGRESS NOTES
Manolo Hernandez presents today for   Chief Complaint   Patient presents with    Neck Pain    Shoulder Pain     bilateral    Arm Pain     bilateral    Follow-up       Is someone accompanying this pt? no    Is the patient using any DME equipment during OV? no    Depression Screening:  3 most recent PHQ Screens 10/5/2020   PHQ Not Done -   Little interest or pleasure in doing things Not at all   Feeling down, depressed, irritable, or hopeless Not at all   Total Score PHQ 2 0       Learning Assessment:  Learning Assessment 6/19/2017   PRIMARY LEARNER Patient   PRIMARY LANGUAGE ENGLISH   LEARNER PREFERENCE PRIMARY LISTENING   ANSWERED BY patient   RELATIONSHIP SELF       Abuse Screening:  Abuse Screening Questionnaire 9/8/2020   Do you ever feel afraid of your partner? N   Are you in a relationship with someone who physically or mentally threatens you? N   Is it safe for you to go home? Y       OPIOID RISK TOOL  Opioid Risk Tool 2/8/2018   Personal history of alcohol abuse? 0   Personal history of illegal drug abuse? 0   Personal history of prescription drug abuse? 0   Age range between 17-45? 0   History of preadolescent sexual abuse? 0   ADD, OCD, bipolar, schizophrenia? 0   Depression? 0   Family history of alcohol abuse? 0   Family history of illegal drug abuse? 0   Family history of prescription drug abuse? 0   Opioid Risk Tool Total Score 0         Pt currently taking Antiplatelet therapy? no    Coordination of Care:  1. Have you been to the ER, urgent care clinic since your last visit? no  Hospitalized since your last visit? no    2. Have you seen or consulted any other health care providers outside of the [address] [place] [address] since your last visit? no Include any pap smears or colon screening.  no

## 2020-10-05 NOTE — PROGRESS NOTES
[facility]  [address] [place], [address]  Phone: [phone]  Fax: [phone]    Pt's YOB: 1965    ASSESSMENT   Diagnoses and all orders for this visit:    1. Cervical facet joint syndrome    2. Foraminal stenosis of cervical region    3. Right arm weakness  -     pregabalin (Lyrica) 50 mg capsule; Take 1 cap by mouth in the evening for 1 week then increase to 2 as directed    4. Spinal stenosis of lumbar region without neurogenic claudication  -     MRI LUMB SPINE WO CONT; Future  -     pregabalin (Lyrica) 50 mg capsule; Take 1 cap by mouth in the evening for 1 week then increase to 2 as directed    5. Chronic right shoulder pain    6. Tobacco use         IMPRESSION AND PLAN:  Jorge Robin is a 63 y.o. right hand dominant male with history of cervical, thoracic, and lumbar pain and presents to the office today for cervical MRI follow up. Pt complains of pain in the neck and both shoulders that radiates down the arms. He admits to progressive pain in the lower back that radiates down the legs, L>R. He also reports numbness in the legs when stanidng for 15 minutes or longer. 1) Pt was given information on cervical arthritis, lumbar arthritis, and rotator cuff exercises. 2) A lumbar MRI was ordered. He has progressive lumbar pain  with bilateral radicular symptom sand difficulty standing/walking despite physical therapy and NSAID's.   3) He was prescribed Lyrica 50 mg 1 cap in the evening for 1 week, then increase to 2 as directed. 4) Mr. Renée Mcintosh has a reminder for a \"due or due soon\" health maintenance. I have asked that he contact his primary care provider, Zonia Whitmore MD, for follow-up on this health maintenance. 5)  demonstrated consistency with prescribing. Follow-up and Dispositions    · Return in about 4 weeks (around 11/2/2020) for Diagnostic Test follow up, Medication follow up.              HISTORY OF PRESENT ILLNESS:  Gail Garcia is a 63 y.o. right hand dominant male with history of cervical, thoracic, and lumbar pain and presents to the office today for cervical MRI follow up. Pt complains of pain in the neck and both shoulders that radiates down the arms. He notes that he frequently drops objects. He attempted to paint his spare bedroom but has decided to hire someone to do it secondary to pain and arm weakness. Pt admits to difficulty with sleep secondary to neck pain and shoulder and notes that he purchased a new bed in 6/2020. He reports a history of shoulder pain and notes that he dislocated his right shoulder years ago. Pt admits to progressive pain in the lower back that radiates down the legs, L>R. He also reports numbness in the legs when stanidng for 15 minutes or longer. Pt notes relief with a left hip injection on 7/21/2020 by Dr. Nisa Mariscal. He reports minimal relief when he underwent physical therapy with cervical traction. Pt notes minimal relief when using a TheraCane. He reports relief when taking a Medrol Dosepak since his last office visit. Pt at this time desires to proceed with a lumbar MRI and medication evaluation.     Pain Scale: 9/10    PCP: Ruthann Vale MD     Past Medical History:   Diagnosis Date    BPH with obstruction/lower urinary tract symptoms     Cancer [facility])     Prostate and colon    Chronic lung disease     [facility] obstructive [facility] disease [facility]     Depression     ED (erectile dysfunction)     GERD (gastroesophageal reflux disease)     Hypercholesterolemia     Hypocitraturia     Kidney stones     Low back pain with radiation     Prostate cancer ([facility]     [id number] [place] Prostate Cancer - s/p RRP with DaVinci by Dr. Sivan Boston in 2011        Social History     Socioeconomic History    Marital status:      Spouse name: Not on file    Number of children: Not on file    Years of education: Not on file    Highest education level: Not on file Occupational History    Occupation: working     Comment:    Social Needs    Financial resource strain: Not on file    Food insecurity     Worry: Not on file     Inability: Not on file   [employer] needs     Medical: Not on file     Non-medical: Not on file   Tobacco Use    Smoking status: Current Every Day Smoker     Packs/day: 0.50     Years: 40.00     Pack years: 20.00     Types: Cigarettes     Last attempt to quit: 11/15/2015     Years since quittin.8    Smokeless tobacco: Former User     Quit date: 9/10/2015   Substance and Sexual Activity    Alcohol use: Yes     Comment:     Drug use: No    Sexual activity: Not on file   Lifestyle    Physical activity     Days per week: Not on file     Minutes per session: Not on file    Stress: Not on file   Relationships    Social connections     Talks on phone: Not on file     Gets together: Not on file     Attends [employer] service: Not on file     Active member of club or organization: Not on file     Attends meetings of clubs or organizations: Not on file     Relationship status: Not on file    Intimate partner violence     Fear of current or ex partner: Not on file     Emotionally abused: Not on file     Physically abused: Not on file     Forced sexual activity: Not on file   Other Topics Concern    Not on file   Social History Narrative    Not on file       Current Outpatient Medications   Medication Sig Dispense Refill    pregabalin (Lyrica) 50 mg capsule Take 1 cap by mouth in the evening for 1 week then increase to 2 as directed 60 Cap 1    celecoxib (CeleBREX) 200 mg capsule Take 1 cap by mouth two (2) times daily (with meals) as needed for pain. 60 Cap 1    sertraline (ZOLOFT) 100 mg tablet Take 1.5 Tabs by mouth daily.       SYMBICORT 160-4.5 mcg/actuation HFAA INL 2 PFS PO BID IN THE MORNING AND IN THE SHANELL  5    ipratropium (ATROVENT) 0.03 % nasal spray U 2 SPRAYS IEN BID PRN      potassium citrate (UROCIT-K) 15 mEq TbER tablet Take 2 Tabs by mouth two (2) times daily (with meals). 360 Tab 3    tamsulosin (FLOMAX) 0.4 mg capsule Take 1 Cap by mouth daily (after dinner). 90 Cap 3    albuterol (PROAIR HFA) 90 mcg/actuation inhaler Take 2 Puffs by inhalation every four (4) hours as needed.  multivit-min/FA/lycopen/lutein (CENTRUM SILVER MEN PO) Take 1 Tab by mouth daily.  rosuvastatin (CRESTOR) 10 mg tablet Take 10 mg by mouth nightly.  pantoprazole (PROTONIX) 40 mg tablet Take 40 mg by mouth daily.  omeprazole (PRILOSEC) 40 mg capsule TK 1 C PO QD      gabapentin (NEURONTIN) 300 mg capsule Take 1 Cap by mouth nightly. (Patient not taking: Reported on 7/7/2020) 30 Cap 1    umeclidinium-vilanterol (ANORO ELLIPTA) 62.5-25 mcg/actuation inhaler Take 1 Puff by inhalation daily.  pravastatin (PRAVACHOL) 20 mg tablet Take 20 mg by mouth nightly. No Known Allergies      REVIEW OF SYSTEMS    Constitutional: Negative for fever, chills, or weight change. Respiratory: Negative for cough or shortness of breath. Cardiovascular: Negative for chest pain or palpitations. Gastrointestinal: Negative for acid reflux, change in bowel habits, or constipation. Genitourinary: Negative for dysuria and flank pain. Musculoskeletal: Positive for cervical, right shoulder, and lumbar pain. Neurological: Negative for headaches, dizziness, or numbness. Endo/Heme/Allergies: Negative for increased bruising. Psychiatric/Behavioral: Positive for difficulty with sleep. PHYSICAL EXAMINATION  Visit Vitals  /76   Pulse 60   Temp 97.7 °F (36.5 °C) (Oral)   Resp 14   Ht 5' 7\" (1.702 m)   Wt 182 lb 9.6 oz (82.8 kg)   SpO2 98%   BMI 28.60 kg/m²       Constitutional: Awake, alert, and in no acute distress. Neurological: 1+ symmetrical DTRs in the upper extremities. 1+ symmetrical DTRs in the lower extremities. Sensation to light touch is intact. Negative Evans's sign bilaterally.   Skin: warm, dry, and intact. Musculoskeletal: Tight across the upper trapezius bilaterally, but improved since his last office visit. Decreased range of motion with side to side cervical flexion. Decreased range of motion and pain in the right shoulder with abduction to 90 degrees. Positive impingement sign on the right. Pain with internal rotation of the right shoulder. Positive empty can test on the right. Good range of motion in the left shoulder. Tenderness to palpation in the lower lumbar region. Moderate pain with extension and axial loading. Improvement with forward flexion. Negative straight leg raise bilaterally.        Biceps  Triceps Deltoids Wrist Ext Wrist Flex Hand Intrin   Right  4/5  4/5  4/5  4/5 +4/5 +4/5   Left +4/5 +4/5 +4/5 +4/5 +4/5 +4/5      Hip Flex  Quads Hamstrings Ankle DF EHL Ankle PF   Right +4/5 +4/5 +4/5 +4/5 +4/5 +4/5   Left +4/5 +4/5 +4/5 +4/5 +4/5 +4/5     IMAGING:    Cervical spine MRI from 9/24/2020 was personally reviewed with the patient and demonstrated:  Results from Hospital Encounter encounter on 09/24/20   MRI CERV SPINE WO CONT    Narrative MR CERVICAL SPINE WITHOUT CONTRAST    HISTORY: Pt has right arm weakness, despite physical therapy and NSAID's. Also  with leg numbness, particularly with prolonged standing. COMPARISON: None    TECHNIQUE: Multisequence multiplanar imaging through the cervical spine. FINDINGS:    Alignment: Intact lordosis  Vertebral body height: Normal  Marrow signal: Unremarkable  Disc spaces: Some disc desiccation but no significant disc space narrowing    Cervicomedullary Junction: Patent  Cervical cord: No cord expansion or atrophy. On axial imaging, findings at each level are as follows:    C2/C3: Mild uncinate hypertrophy, left more than right. Patent canal and  foramina. C3/C4: Mild disc bulge and osteophyte formation. Patent canal and foramina. C4/C5: Slightly more prominent broad-based disc osteophyte complex formation.   Also with some facet arthropathy. There is moderate right foraminal stenosis. The canal and the left foramen are patent. C5/C6: Undulating broad-based disc osteophyte complex formation. Slight  eccentric prominence to the left. Some facet arthropathy with buckling of  ligamentum flavum. No significant spinal stenosis. Minor ventral cord contact  and slight flattening left of midline from the disc pathology. Adequately patent  foramina. C6/C7: Mild disc protrusion with annular tear. Patent canal and foramina. C7/T1: Patent canal and foramina. Other structures: Unremarkable      Impression IMPRESSION:    1. Multilevel degenerative findings along cervical spine  -Overall relatively mild  -There is a moderate right foraminal stenosis at C4-5  -There is no significant cord compromise    Thank you for enabling us to participate in the care of this patient. Thoracic spine 2V x-rays from 7/7/2020 were personally reviewed with the patient and demonstrated:  Multiple bridging osteophytes. Degenerative discs in the upper and mid thoracic regions.     Lumbar spine 4V x-rays from 7/7/2020 were personally reviewed with the patient and demonstrated:  Degenerative disc at L5-S1. Multilevel degenerative facets. Atherosclerosis. No instability      Lumbar spine MRI from 4/13/2015 was personally reviewed with the patient and demonstrated:  Findings:     Sagittal images reveal overall normal vertebral body morphology. No fractures  noted. No suspicious lesions.       Alignments are anatomic, no evidence for subluxation.       Conus medullaris ends at the L1 vertebral body level.        Correlation of axial and sagittal images reveals the following:     At L1-L2: No significant disc pathology. No significant facet arthropathy. No  central canal or foraminal stenosis.     At L2-L3: No significant disc pathology. No significant facet arthropathy.  No  central canal or foraminal stenosis.     At L3-L4: Mild nonfocal disc protrusion. Posterior epidural lipomatosis. Mild  facet arthropathy. Mild central canal stenosis. Mild foraminal stenosis.     At L4-L5: Mild to moderate broad-based disc protrusion, nonfocal. Moderate  central canal stenosis at 7.9 mm. Indentation of the thecal sac ventrally and  abutment of the crossing L5 nerve roots. Moderate left and mild to moderate  right foraminal stenosis.     At L5-S1: Moderate loss of disc height posteriorly. Broad-based disc protrusion  with some underlying osteophyte complex and a left paracentral extrusion  component that migrates several millimeters above the disc space deflecting the  thecal sac to the right and posterior. The crossing left S1 nerve root is  compressed between the disc and the facet joint. The crossing right S1 nerve  root is minimally abutted but not displaced. Mild at most foraminal stenosis.     Visualized portions of the sacroiliac joints are unremarkable.  Incidentally  imaged retroperitoneal structures are unremarkable as well     Impression:     Degenerative changes as above. L5-S1 most likely be symptomatic with disc  extrusion compressing the crossing left S1 nerve root against the facet joint.     Written by Sanchez Arteaga, as dictated by Shira Pham MD.  I, Dr. Shira Pham confirm that all documentation is accurate.

## 2020-10-05 NOTE — LETTER
10/6/20 Patient: Lowell Lopez YOB: 1965 Date of Visit: 10/5/2020 Jeni Leija MD 
[address] [facility] [address] [address] VIA Facsimile: [phone] Dear Jeni Leija MD, Thank you for referring Mr. Annika Edwards to [address] [place] [address] for evaluation. My notes for this consultation are attached. If you have questions, please do not hesitate to call me. I look forward to following your patient along with you. Sincerely, Tejas Sharma MD

## 2020-10-07 ENCOUNTER — TRANSCRIBE ORDER (OUTPATIENT)
Dept: SCHEDULING | Age: 55
End: 2020-10-07

## 2020-10-07 DIAGNOSIS — R06.09 DYSPNEA ON EXERTION: Primary | ICD-10-CM

## 2020-10-12 DIAGNOSIS — M48.061 SPINAL STENOSIS OF LUMBAR REGION WITHOUT NEUROGENIC CLAUDICATION: ICD-10-CM

## 2020-10-21 ENCOUNTER — HOSPITAL ENCOUNTER (OUTPATIENT)
Age: 55
Discharge: HOME | End: 2020-10-21
Attending: PHYSICAL MEDICINE & REHABILITATION
Payer: COMMERCIAL

## 2020-10-21 PROCEDURE — 72148 MRI LUMBAR SPINE W/O DYE: CPT

## 2020-10-27 ENCOUNTER — HOSPITAL ENCOUNTER (OUTPATIENT)
Dept: CT IMAGING | Age: 55
Discharge: HOME | End: 2020-10-27
Attending: INTERNAL MEDICINE
Payer: COMMERCIAL

## 2020-10-27 DIAGNOSIS — R06.09 DYSPNEA ON EXERTION: ICD-10-CM

## 2020-10-27 PROCEDURE — 71250 CT THORAX DX C-: CPT

## 2020-11-10 ENCOUNTER — OFFICE VISIT (OUTPATIENT)
Dept: ORTHOPEDIC SURGERY | Age: 55
End: 2020-11-10
Payer: COMMERCIAL

## 2020-11-10 VITALS
SYSTOLIC BLOOD PRESSURE: 129 MMHG | HEIGHT: 67 IN | WEIGHT: 177 LBS | OXYGEN SATURATION: 98 % | RESPIRATION RATE: 18 BRPM | DIASTOLIC BLOOD PRESSURE: 78 MMHG | TEMPERATURE: 98.5 F | HEART RATE: 63 BPM | BODY MASS INDEX: 27.78 KG/M2

## 2020-11-10 DIAGNOSIS — M25.511 CHRONIC RIGHT SHOULDER PAIN: ICD-10-CM

## 2020-11-10 DIAGNOSIS — M54.16 LEFT LUMBAR RADICULOPATHY: ICD-10-CM

## 2020-11-10 DIAGNOSIS — G89.29 CHRONIC RIGHT SHOULDER PAIN: ICD-10-CM

## 2020-11-10 DIAGNOSIS — Z72.0 TOBACCO USE: ICD-10-CM

## 2020-11-10 DIAGNOSIS — M48.061 SPINAL STENOSIS OF LUMBAR REGION WITHOUT NEUROGENIC CLAUDICATION: ICD-10-CM

## 2020-11-10 DIAGNOSIS — M51.26 HNP (HERNIATED NUCLEUS PULPOSUS), LUMBAR: Primary | ICD-10-CM

## 2020-11-10 PROCEDURE — 99213 OFFICE O/P EST LOW 20 MIN: CPT | Performed by: PHYSICAL MEDICINE & REHABILITATION

## 2020-11-10 NOTE — LETTER
11/13/20 Patient: Hanna Shoemaker YOB: 1965 Date of Visit: 11/10/2020 Clive Rees MD 
[address] [place] [address] [address] [facility] [address] VIA Facsimile: [phone] Dear Clive Rees MD, Thank you for referring Mr. Fareed Allen to [address] [place] [address] for evaluation. My notes for this consultation are attached. If you have questions, please do not hesitate to call me. I look forward to following your patient along with you. Sincerely, Kevin Leone MD

## 2020-11-10 NOTE — PROGRESS NOTES
Debbie Manjarrez presents today for   Chief Complaint   Patient presents with    Back Pain       Is someone accompanying this pt? no    Is the patient using any DME equipment during OV? no    Depression Screening:  3 most recent PHQ Screens 10/5/2020   PHQ Not Done -   Little interest or pleasure in doing things Not at all   Feeling down, depressed, irritable, or hopeless Not at all   Total Score PHQ 2 0       Learning Assessment:  Learning Assessment 6/19/2017   PRIMARY LEARNER Patient   PRIMARY LANGUAGE ENGLISH   LEARNER PREFERENCE PRIMARY LISTENING   ANSWERED BY patient   RELATIONSHIP SELF       Abuse Screening:  Abuse Screening Questionnaire 9/8/2020   Do you ever feel afraid of your partner? N   Are you in a relationship with someone who physically or mentally threatens you? N   Is it safe for you to go home? Y       Fall Risk  No flowsheet data found. OPIOID RISK TOOL  Opioid Risk Tool 2/8/2018   Personal history of alcohol abuse? 0   Personal history of illegal drug abuse? 0   Personal history of prescription drug abuse? 0   Age range between 17-45? 0   History of preadolescent sexual abuse? 0   ADD, OCD, bipolar, schizophrenia? 0   Depression? 0   Family history of alcohol abuse? 0   Family history of illegal drug abuse? 0   Family history of prescription drug abuse? 0   Opioid Risk Tool Total Score 0       Coordination of Care:  1. Have you been to the ER, urgent care clinic since your last visit? no  Hospitalized since your last visit? no    2. Have you seen or consulted any other health care providers outside of the [address] [place] [address] since your last visit? no Include any pap smears or colon screening.  no

## 2020-11-10 NOTE — PATIENT INSTRUCTIONS
Low Back Arthritis: Exercises Introduction Here are some examples of typical rehabilitation exercises for your condition. Start each exercise slowly. Ease off the exercise if you start to have pain. Your doctor or physical therapist will tell you when you can start these exercises and which ones will work best for you. When you are not being active, find a comfortable position for rest. Some people are comfortable on the floor or a medium-firm bed with a small pillow under their head and another under their knees. Some people prefer to lie on their side with a pillow between their knees. Don't stay in one position for too long. Take short walks (10 to 20 minutes) every 2 to 3 hours. Avoid slopes, hills, and stairs until you feel better. Walk only distances you can manage without pain, especially leg pain. How to do the exercises Pelvic tilt 1. Lie on your back with your knees bent. 2. \"Brace\" your stomachtighten your muscles by pulling in and imagining your belly button moving toward your spine. 3. Press your lower back into the floor. You should feel your hips and pelvis rock back. 4. Hold for 6 seconds while breathing smoothly. 5. Relax and allow your pelvis and hips to rock forward. 6. Repeat 8 to 12 times. Back stretches 1. Get down on your hands and knees on the floor. 2. Relax your head and allow it to droop. Round your back up toward the ceiling until you feel a nice stretch in your upper, middle, and lower back. Hold this stretch for as long as it feels comfortable, or about 15 to 30 seconds. 3. Return to the starting position with a flat back while you are on your hands and knees. 4. Let your back sway by pressing your stomach toward the floor. Lift your buttocks toward the ceiling. 5. Hold this position for 15 to 30 seconds. 6. Repeat 2 to 4 times. Follow-up care is a key part of your treatment and safety.  Be sure to make and go to all appointments, and call your doctor if you are having problems. It's also a good idea to know your test results and keep a list of the medicines you take. Where can you learn more? Go to http://www.gray.com/ Enter E982 in the search box to learn more about \"Low Back Arthritis: Exercises. \" Current as of: March 2, 2020               Content Version: 12.6 © 2006-2020 [employer]. Care instructions adapted under license by [employer] (which disclaims liability or warranty for this information). If you have questions about a medical condition or this instruction, always ask your healthcare professional. [address] any warranty or liability for your use of this information.

## 2020-11-10 NOTE — PROGRESS NOTES
[facility]  [address] [place], [facility] [address]  Phone: [phone]  Fax: [phone]    Pt's YOB: 1965    ASSESSMENT   Diagnoses and all orders for this visit:    1. HNP (herniated nucleus pulposus), lumbar    2. Left lumbar radiculopathy    3. Spinal stenosis of lumbar region without neurogenic claudication    4. Chronic right shoulder pain    5. Tobacco use         IMPRESSION AND PLAN:  Severiano Quarles is a 63 y.o. right hand dominant male with history of cervical, thoracic, and lumbar pain. He complains of pain in the lower back that radiates down the left leg to the knee. Pt prescribed Lyrica 50 mg 2 caps QHS at his last office visit but notes that he has only taken the medication a couple times since his last office visit. 1) Pt was given information on lumbar arthritis exercises. 2) He will restart Lyrica 50 mg 2 caps QHS, tapering up as directed. Pt was instructed to take the medication consistently. 3) Mr. Dean Williamson has a reminder for a \"due or due soon\" health maintenance. I have asked that he contact his primary care provider, Eli Smith MD, for follow-up on this health maintenance. 4)  demonstrated consistency with prescribing. 5) Smoking cessation strongly recommended  Follow-up and Dispositions    · Return in about 3 weeks (around 12/1/2020) for Medication follow up in 3-4 weeks and for completion of disability form. HISTORY OF PRESENT ILLNESS:  Severiano Quarles is a 63 y.o. right hand dominant male with history of cervical, thoracic, and lumbar pain and presents to the office today for lumbar MRI follow up. He complains of pain in the lower back that radiates down the left leg to the knee. Pt also reports pain in the neck and in both shoulders that radiates down the arms.  He was prescribed Lyrica 50 mg 2 caps QHS at his last office visit and notes that he has only taken the medication a couple times since he thought it may be a narcotic. Pt admits to difficulty with sleep secondary to pain and notes that he takes Zoloft. He also takes Tylenol Arthritis as needed and reports that he has often has a heating pad on his chair. Pt at this time desires to proceed with medication evaluation. Of note, he is a smoker. Pt reports that he had to put his 23 y.o. beagle down on Saturday. He notes that he has filed for disability.      Pain Scale: 6/10    PCP: Gabriel Dewitt MD     Past Medical History:   Diagnosis Date    BPH with obstruction/lower urinary tract symptoms     [facility])     Prostate and colon    Chronic lung disease     Chronic obstructive pulmonary disease ([facility] [address]     Depression     ED (erectile dysfunction)     GERD (gastroesophageal reflux disease)     Hypercholesterolemia     Hypocitraturia     Kidney stones     Low back pain with radiation     Prostate cancer (HCC)     [id number] [place] 7 Prostate Cancer - s/p RRP with DaVinci by Dr. Jose Enrique Rojas in         Social History     Socioeconomic History    Marital status:      Spouse name: Not on file    Number of children: Not on file    Years of education: Not on file    Highest education level: Not on file   Occupational History    Occupation: working     Comment:    Social Needs    Financial resource strain: Not on file    Food insecurity     Worry: Not on file     Inability: Not on file   [employer] needs     Medical: Not on file     Non-medical: Not on file   Tobacco Use    Smoking status: Current Every Day Smoker     Packs/day: 0.50     Years: 40.00     Pack years: 20.00     Types: Cigarettes     Last attempt to quit: 11/15/2015     Years since quittin.0    Smokeless tobacco: Former User     Quit date: 9/10/2015   Substance and Sexual Activity    Alcohol use: Yes     Comment:     Drug use: No    Sexual activity: Not on file   Lifestyle    Physical activity     Days per week: Not on file     Minutes per session: Not on file    Stress: Not on file   Relationships    Social connections     Talks on phone: Not on file     Gets together: Not on file     Attends Yazdanism service: Not on file     Active member of club or organization: Not on file     Attends meetings of clubs or organizations: Not on file     Relationship status: Not on file    Intimate partner violence     Fear of current or ex partner: Not on file     Emotionally abused: Not on file     Physically abused: Not on file     Forced sexual activity: Not on file   Other Topics Concern    Not on file   Social History Narrative    Not on file       Current Outpatient Medications   Medication Sig Dispense Refill    tamsulosin (FLOMAX) 0.4 mg capsule Take 1 Cap by mouth daily (after dinner). 90 Cap 0    sertraline (ZOLOFT) 100 mg tablet Take 1.5 Tabs by mouth daily.  omeprazole (PRILOSEC) 40 mg capsule TK 1 C PO QD      SYMBICORT 160-4.5 mcg/actuation HFAA INL 2 PFS PO BID IN THE MORNING AND IN THE SHANELL  5    ipratropium (ATROVENT) 0.03 % nasal spray U 2 SPRAYS IEN BID PRN      potassium citrate (UROCIT-K) 15 mEq TbER tablet Take 2 Tabs by mouth two (2) times daily (with meals). 360 Tab 3    albuterol (PROAIR HFA) 90 mcg/actuation inhaler Take 2 Puffs by inhalation every four (4) hours as needed.  umeclidinium-vilanterol (ANORO ELLIPTA) 62.5-25 mcg/actuation inhaler Take 1 Puff by inhalation daily.  pravastatin (PRAVACHOL) 20 mg tablet Take 20 mg by mouth nightly.  multivit-min/FA/lycopen/lutein (CENTRUM SILVER MEN PO) Take 1 Tab by mouth daily.  pantoprazole (PROTONIX) 40 mg tablet Take 40 mg by mouth daily.  pregabalin (Lyrica) 50 mg capsule Take 1 cap by mouth in the evening for 1 week then increase to 2 as directed 60 Cap 1    celecoxib (CeleBREX) 200 mg capsule Take 1 cap by mouth two (2) times daily (with meals) as needed for pain.  60 Cap 1    gabapentin (NEURONTIN) 300 mg capsule Take 1 Cap by mouth nightly. (Patient not taking: Reported on 7/7/2020) 30 Cap 1    rosuvastatin (CRESTOR) 10 mg tablet Take 10 mg by mouth nightly. No Known Allergies      REVIEW OF SYSTEMS    Constitutional: Negative for fever, chills, or weight change. Respiratory: Negative for cough or shortness of breath. Cardiovascular: Negative for chest pain or palpitations. Gastrointestinal: Negative for acid reflux, change in bowel habits, or constipation. Genitourinary: Negative for dysuria and flank pain. Musculoskeletal: Positive for cervical and lumbar pain  Neurological: Negative for headaches or dizziness. Positive for numbness. Endo/Heme/Allergies: Negative for increased bruising. Psychiatric/Behavioral: Positive for difficulty with sleep. As per HPI    PHYSICAL EXAMINATION  Visit Vitals  /78 (BP 1 Location: Right arm, BP Patient Position: Sitting)   Pulse 63   Temp 98.5 °F (36.9 °C) (Skin)   Resp 18   Ht 5' 7\" (1.702 m)   Wt 177 lb (80.3 kg)   SpO2 98% Comment: RA   BMI 27.72 kg/m²       Constitutional: Awake, alert, and in no acute distress. Neurological: 1+ symmetrical DTRs in the upper extremities. 1+ symmetrical DTRs in the lower extremities. Sensation to light touch is intact. Negative Evans's sign bilaterally. Skin: warm, dry, and intact. Musculoskeletal: Tight across the upper trapezius bilaterally. Decreased range of motion with side to side cervical flexion. No pain with extension, axial loading, or forward flexion. Tenderness to palpation in the lower lumbar region. Mild pain with extension and axial loading. Pain with forward flexion. Pain and limited range of motion with internal rotation of his hips.        Biceps  Triceps Deltoids Wrist Ext Wrist Flex Hand Intrin   Right +4/5 +4/5 +4/5 +4/5 +4/5 +4/5   Left +4/5 +4/5 +4/5 +4/5 +4/5 +4/5      Hip Flex  Quads Hamstrings Ankle DF EHL Ankle PF   Right +4/5 +4/5 +4/5 +4/5 +4/5 +4/5   Left +4/5 +4/5 +4/5 +4/5 +4/5 +4/5 IMAGING:    Lumbar spine MRI from 10/12/2020 was personally reviewed with the patient and demonstrated:  Results from Orders Only encounter on 10/12/20   MRI LUMB SPINE WO CONT    Narrative EXAM: Lumbar MRI without contrast    CLINICAL INDICATION: progressive lumbar pain with bilateral radicular symptom  sand difficulty standing/walking despite physical therapy and NSAID's.    TECHNIQUE: MRI of the lumbar spine without contrast obtained. Multiplanar  multisequence MR images of the lumbar spine obtained. IV Contrast: None    COMPARISON: 4/13/2015    FINDINGS:   All numbering assumes 5 lumbar type vertebrae. Postoperative Changes: None    Alignment:  Unremarkable. Osseous structures/Marrow: Unremarkable. .     The cord terminates at T12. No cord signal abnormalities appreciated. Retroperitoneum/Incidental: The abdominal aorta is without evidence of aneurysm. No paraaortic adenopathy appreciated. In the visualized portion of the right  kidney, there is a rounded structure which is 9 mm and may represent a cyst.    Lower Thoracic Spine: The majority of the lower thoracic spine is only  visualized on the sagittal views. Minimal degenerative changes are noted. No  significant canal or neural foraminal stenosis. T12-L1: No significant canal or neural foraminal stenosis. L1-L2 level: No evidence of significant canal or neural foraminal stenosis. L2-L3: Minimal degenerative changes. No significant canal or neural foraminal  stenosis. L3-L4: Congenitally shortened pedicles are present at this level. A central disc  protrusion is noted. This is minimally progressed since prior imaging. Mild  canal narrowing is noted. Mild facet arthropathy are noted. Mild bilateral  neural foraminal narrowing is present. L4-L5: Congenitally shortened pedicles is noted. A central disc protrusion is  present. This is slightly progressed from prior imaging.  There is narrowing of  the right lateral recess with likely compression of the descending right L5  nerve root. There is possible abutment of the left L5 nerve root in the left  lateral recess. No significant canal stenosis. Mild bilateral neural foraminal  narrowing. L5-S1: Broad-based disc bulge with a left paracentral disc extrusion is noted. The extrusion is mildly smaller in superior to inferior dimension compared with  most recent prior MR. There is compression of the descending left S1 nerve root  in the left lateral recess. The disc bulge at the base is slightly more  conspicuous. There is mild/moderate canal narrowing. There is abutment of the  descending S1 nerve roots as they cross the disc. Mild bilateral neural  foraminal narrowing is noted. Impression Impression:  1. Left paracentral disc extrusion at L5-S1 is mildly smaller but still  compresses the left S1 nerve root in the left lateral recess. There is a  slightly more conspicuous disc bulge base at this level which abuts the  descending S1 nerve roots as they cross the disc. 2.  Mild progression at L4-L5 with likely compression of the right L5 nerve root  in the right lateral recess and possible on the left. 3.  Minimal progression in disease at L3-L4 without evidence of severe canal or  neural foraminal narrowing. Cervical spine MRI from 9/24/2020 was personally reviewed with the patient and demonstrated:       Results from [place] encounter on 09/24/20   MRI CERV SPINE WO CONT     Narrative MR CERVICAL SPINE WITHOUT CONTRAST     HISTORY: Pt has right arm weakness, despite physical therapy and NSAID's.  Also  with leg numbness, particularly with prolonged standing.     COMPARISON: None     TECHNIQUE: Multisequence multiplanar imaging through the cervical spine.     FINDINGS:     Alignment: Intact lordosis  Vertebral body height: Normal  Marrow signal: Unremarkable  Disc spaces: Some disc desiccation but no significant disc space narrowing     Cervicomedullary Junction: Patent  Cervical cord: No cord expansion or atrophy.      On axial imaging, findings at each level are as follows:     C2/C3: Mild uncinate hypertrophy, left more than right. Patent canal and  foramina.     C3/C4: Mild disc bulge and osteophyte formation. Patent canal and foramina.     C4/C5: Slightly more prominent broad-based disc osteophyte complex formation. Also with some facet arthropathy. There is moderate right foraminal stenosis. The canal and the left foramen are patent.     C5/C6: Undulating broad-based disc osteophyte complex formation. Slight  eccentric prominence to the left. Some facet arthropathy with buckling of  ligamentum flavum. No significant spinal stenosis. Minor ventral cord contact  and slight flattening left of midline from the disc pathology. Adequately patent  foramina.     C6/C7: Mild disc protrusion with annular tear. Patent canal and foramina.     C7/T1: Patent canal and foramina.     Other structures: Unremarkable        Impression IMPRESSION:     1. Multilevel degenerative findings along cervical spine  -Overall relatively mild  -There is a moderate right foraminal stenosis at C4-5  -There is no significant cord compromise     Thank you for enabling us to participate in the care of this patient.      Thoracic spine 2V x-rays from 7/7/2020 were personally reviewed with the patient and demonstrated:  Multiple bridging osteophytes. Degenerative discs in the upper and mid thoracic regions. Written by Flores Kelley, as dictated by Devere Baumgarten, MD.  I, Dr. Devere Baumgarten confirm that all documentation is accurate.

## 2020-12-03 NOTE — PROGRESS NOTES
[facility]  [address] [place], [facility] [address]  Phone: [phone]  Fax: [phone]    Pt's YOB: 1965    ASSESSMENT   Diagnoses and all orders for this visit:    1. HNP (herniated nucleus pulposus), lumbar    2. Left lumbar radiculopathy  -     pregabalin (LYRICA) 100 mg capsule; Take 1 Cap by mouth nightly. Max Daily Amount: 100 mg.    3. Spinal stenosis of lumbar region without neurogenic claudication    4. Chronic right shoulder pain    5. Tobacco use         IMPRESSION AND PLAN:  Carlos Parker is a 63 y.o. right hand dominant male with history of cervical, thoracic, and lumbar pain. He complains of pain in the lower back that radiates down the left leg to the knee. Pt increased his Lyrica 50 mg to 2 caps QHS with benefit. He also takes Tylenol Arthritis as needed in the morning. 1) Pt was given information on lumbar exercises. 2) He will adjust his Lyrica 50 mg (2)  to 100 mg QHS. 3) I recommended the patient try sheri chi and chair yoga. 4) Mr. Berry Live has a reminder for a \"due or due soon\" health maintenance. I have asked that he contact his primary care provider, Glen Franks MD, for follow-up on this health maintenance. 5)  demonstrated consistency with prescribing. Follow-up and Dispositions    · Return in about 5 months (around 5/7/2021). HISTORY OF PRESENT ILLNESS:  Carlos Parker is a 63 y.o. right hand dominant male with history of cervical, thoracic, and lumbar pain and presents to the office today for follow up. He complains of pain in the lower back that radiates down the left leg to the knee. Pt notes pain in the morning upon waking which generally improves with activity. He increased his Lyrica 50 mg to 2 caps QHS.  Pt notes relief within about 1-1.5 hours after taking the Lyrica, which he takes around 9 PM. He denies any daytime sedation with the Lyrica and notes that she sleeps better on the medication. Pt takes Tylenol Arthritis in the morning as needed. He had a chest CT since his last office visit and notes that he was placed on oxygen at night. Pt has also been scheduled for sleep study. and Pt at this time desires to continue with current care. Of note, he is a smoker. He is currently trying to quit and started Chantix a little over 2 weeks ago.     Pain Scale: 0 - No pain/10    PCP: Jing Live MD     Past Medical History:   Diagnosis Date    BPH with obstruction/lower urinary tract symptoms     Cancer [facility])     Prostate and colon    Chronic lung disease     Chronic obstructive pulmonary disease ([facility] [address]     Depression     ED (erectile dysfunction)     GERD (gastroesophageal reflux disease)     Hypercholesterolemia     Hypocitraturia     Kidney stones     Low back pain with radiation     Prostate cancer (HCC)     [id number] Rule 7 Prostate Cancer - s/p RRP with DaVinci by Dr. Oni Ahuja in         Social History     Socioeconomic History    Marital status:      Spouse name: Not on file    Number of children: Not on file    Years of education: Not on file    Highest education level: Not on file   Occupational History    Occupation: working     Comment:    Social Needs    Financial resource strain: Not on file    Food insecurity     Worry: Not on file     Inability: Not on file   [place] needs     Medical: Not on file     Non-medical: Not on file   Tobacco Use    Smoking status: Current Every Day Smoker     Packs/day: 0.50     Years: 40.00     Pack years: 20.00     Types: Cigarettes     Last attempt to quit: 11/15/2015     Years since quittin.0    Smokeless tobacco: Former User     Quit date: 9/10/2015   Substance and Sexual Activity    Alcohol use: Yes     Comment:     Drug use: No    Sexual activity: Not on file   Lifestyle    Physical activity     Days per week: Not on file     Minutes per session: Not on file    Stress: Not on file   Relationships    Social connections     Talks on phone: Not on file     Gets together: Not on file     Attends [employer] service: Not on file     Active member of club or organization: Not on file     Attends meetings of clubs or organizations: Not on file     Relationship status: Not on file    Intimate partner violence     Fear of current or ex partner: Not on file     Emotionally abused: Not on file     Physically abused: Not on file     Forced sexual activity: Not on file   Other Topics Concern    Not on file   Social History Narrative    Not on file       Current Outpatient Medications   Medication Sig Dispense Refill    Chantix Starting Month Box 0.5 mg (11)- 1 mg (42) DsPk FOLLOW PACKAGE DIRECTIONS PER PACKAGE      tamsulosin (FLOMAX) 0.4 mg capsule Take 1 Cap by mouth daily (after dinner). 90 Cap 0    pregabalin (Lyrica) 50 mg capsule Take 1 cap by mouth in the evening for 1 week then increase to 2 as directed 60 Cap 1    celecoxib (CeleBREX) 200 mg capsule Take 1 cap by mouth two (2) times daily (with meals) as needed for pain. 60 Cap 1    sertraline (ZOLOFT) 100 mg tablet Take 1.5 Tabs by mouth daily.  SYMBICORT 160-4.5 mcg/actuation HFAA INL 2 PFS PO BID IN THE MORNING AND IN THE SHANELL  5    ipratropium (ATROVENT) 0.03 % nasal spray U 2 SPRAYS IEN BID PRN      potassium citrate (UROCIT-K) 15 mEq TbER tablet Take 2 Tabs by mouth two (2) times daily (with meals). 360 Tab 3    albuterol (PROAIR HFA) 90 mcg/actuation inhaler Take 2 Puffs by inhalation every four (4) hours as needed.  umeclidinium-vilanterol (ANORO ELLIPTA) 62.5-25 mcg/actuation inhaler Take 1 Puff by inhalation daily.  multivit-min/FA/lycopen/lutein (CENTRUM SILVER MEN PO) Take 1 Tab by mouth daily.  rosuvastatin (CRESTOR) 10 mg tablet Take 10 mg by mouth nightly.  pantoprazole (PROTONIX) 40 mg tablet Take 40 mg by mouth daily.         omeprazole (PRILOSEC) 40 mg capsule TK 1 C PO QD  gabapentin (NEURONTIN) 300 mg capsule Take 1 Cap by mouth nightly. 30 Cap 1    pravastatin (PRAVACHOL) 20 mg tablet Take 20 mg by mouth nightly. No Known Allergies      REVIEW OF SYSTEMS    Constitutional: Negative for fever, chills, or weight change. Respiratory: Negative for cough or shortness of breath. Cardiovascular: Negative for chest pain or palpitations. Gastrointestinal: Negative for acid reflux, change in bowel habits, or constipation. Genitourinary: Negative for dysuria and flank pain. Musculoskeletal: Positive for lumbar pain. Neurological: Negative for headaches, dizziness, positive for numbness. Psychiatric/Behavioral: Positive for difficulty with sleep. As per HPI    PHYSICAL EXAMINATION  Visit Vitals  /81 (BP 1 Location: Right arm, BP Patient Position: Sitting)   Pulse (!) 51   Temp 98.6 °F (37 °C) (Skin)   Resp 16   Ht 5' 7\" (1.702 m)   Wt 181 lb (82.1 kg)   SpO2 98%   BMI 28.35 kg/m²       Constitutional: Awake, alert, and in no acute distress. Neurological: 1+ symmetrical DTRs in the upper extremities. 1+ symmetrical DTRs in the lower extremities. Sensation to light touch is intact. Negative Evans's sign bilaterally. Skin: warm, dry, and intact. Musculoskeletal: Tenderness to palpation in the lower lumbar region. Moderate pain with extension and axial loading. No pain with internal or external rotation of his hips. Negative straight leg raise bilaterally.      Hip Flex  Quads Hamstrings Ankle DF EHL Ankle PF   Right +4/5 +4/5 +4/5 +4/5 +4/5 +4/5   Left +4/5 +4/5 +4/5 +4/5 +4/5 +4/5     IMAGING:    Lumbar spine MRI from 10/12/2020 was personally reviewed with the patient and demonstrated:       Results from Orders Only encounter on 10/12/20   MRI LUMB SPINE WO CONT     Narrative EXAM: Lumbar MRI without contrast     CLINICAL INDICATION: progressive lumbar pain with bilateral radicular symptom  sand difficulty standing/walking despite physical therapy and NSAID's.     TECHNIQUE: MRI of the lumbar spine without contrast obtained. Multiplanar  multisequence MR images of the lumbar spine obtained.     IV Contrast: None     COMPARISON: 4/13/2015     FINDINGS:   All numbering assumes 5 lumbar type vertebrae.      Postoperative Changes: None     Alignment:  Unremarkable.      Osseous structures/Marrow: Unremarkable. .      The cord terminates at T12. No cord signal abnormalities appreciated.     Retroperitoneum/Incidental: The abdominal aorta is without evidence of aneurysm. No paraaortic adenopathy appreciated. In the visualized portion of the right  kidney, there is a rounded structure which is 9 mm and may represent a cyst.     Lower Thoracic Spine: The majority of the lower thoracic spine is only  visualized on the sagittal views. Minimal degenerative changes are noted. No  significant canal or neural foraminal stenosis.     T12-L1: No significant canal or neural foraminal stenosis.      L1-L2 level: No evidence of significant canal or neural foraminal stenosis.     L2-L3: Minimal degenerative changes. No significant canal or neural foraminal  stenosis.     L3-L4: Congenitally shortened pedicles are present at this level. A central disc  protrusion is noted. This is minimally progressed since prior imaging. Mild  canal narrowing is noted. Mild facet arthropathy are noted. Mild bilateral  neural foraminal narrowing is present.     L4-L5: Congenitally shortened pedicles is noted. A central disc protrusion is  present. This is slightly progressed from prior imaging. There is narrowing of  the right lateral recess with likely compression of the descending right L5  nerve root. There is possible abutment of the left L5 nerve root in the left  lateral recess. No significant canal stenosis. Mild bilateral neural foraminal  narrowing.     L5-S1: Broad-based disc bulge with a left paracentral disc extrusion is noted.   The extrusion is mildly smaller in superior to inferior dimension compared with  most recent prior MR. There is compression of the descending left S1 nerve root  in the left lateral recess. The disc bulge at the base is slightly more  conspicuous. There is mild/moderate canal narrowing. There is abutment of the  descending S1 nerve roots as they cross the disc. Mild bilateral neural  foraminal narrowing is noted.        Impression Impression:  1. Left paracentral disc extrusion at L5-S1 is mildly smaller but still  compresses the left S1 nerve root in the left lateral recess. There is a  slightly more conspicuous disc bulge base at this level which abuts the  descending S1 nerve roots as they cross the disc.      2.  Mild progression at L4-L5 with likely compression of the right L5 nerve root  in the right lateral recess and possible on the left.      3. Minimal progression in disease at L3-L4 without evidence of severe canal or  neural foraminal narrowing.                Cervical spine MRI from 9/24/2020 was personally reviewed with the patient and demonstrated:          Results from [place] encounter on 09/24/20   MRI CERV SPINE WO CONT     Narrative MR CERVICAL SPINE WITHOUT CONTRAST     HISTORY: Pt has right arm weakness, despite physical therapy and NSAID's. Also  with leg numbness, particularly with prolonged standing.     COMPARISON: None     TECHNIQUE: Multisequence multiplanar imaging through the cervical spine.     FINDINGS:     Alignment: Intact lordosis  Vertebral body height: Normal  Marrow signal: Unremarkable  Disc spaces: Some disc desiccation but no significant disc space narrowing     Cervicomedullary Junction: Patent  Cervical cord: No cord expansion or atrophy.      On axial imaging, findings at each level are as follows:     C2/C3: Mild uncinate hypertrophy, left more than right. Patent canal and  foramina.     C3/C4: Mild disc bulge and osteophyte formation.  Patent canal and foramina.     C4/C5: Slightly more prominent broad-based disc osteophyte complex formation. Also with some facet arthropathy. There is moderate right foraminal stenosis. The canal and the left foramen are patent.     C5/C6: Undulating broad-based disc osteophyte complex formation. Slight  eccentric prominence to the left. Some facet arthropathy with buckling of  ligamentum flavum. No significant spinal stenosis. Minor ventral cord contact  and slight flattening left of midline from the disc pathology. Adequately patent  foramina.     C6/C7: Mild disc protrusion with annular tear. Patent canal and foramina.     C7/T1: Patent canal and foramina.     Other structures: Unremarkable        Impression IMPRESSION:     1. Multilevel degenerative findings along cervical spine  -Overall relatively mild  -There is a moderate right foraminal stenosis at C4-5  -There is no significant cord compromise     Thank you for enabling us to participate in the care of this patient.      Thoracic spine 2V x-rays from 7/7/2020 were personally reviewed with the patient and demonstrated:  Multiple bridging osteophytes. Degenerative discs in the upper and mid thoracic regions.       Written by Dinah Hoang, as dictated by Jr Whitlock MD.  I, Dr. Jr Whitlock confirm that all documentation is accurate.

## 2020-12-07 ENCOUNTER — OFFICE VISIT (OUTPATIENT)
Dept: ORTHOPEDIC SURGERY | Age: 55
End: 2020-12-07
Payer: COMMERCIAL

## 2020-12-07 VITALS
HEART RATE: 51 BPM | SYSTOLIC BLOOD PRESSURE: 137 MMHG | DIASTOLIC BLOOD PRESSURE: 81 MMHG | HEIGHT: 67 IN | RESPIRATION RATE: 16 BRPM | WEIGHT: 181 LBS | TEMPERATURE: 98.6 F | OXYGEN SATURATION: 98 % | BODY MASS INDEX: 28.41 KG/M2

## 2020-12-07 DIAGNOSIS — M25.511 CHRONIC RIGHT SHOULDER PAIN: ICD-10-CM

## 2020-12-07 DIAGNOSIS — G89.29 CHRONIC RIGHT SHOULDER PAIN: ICD-10-CM

## 2020-12-07 DIAGNOSIS — M54.16 LEFT LUMBAR RADICULOPATHY: ICD-10-CM

## 2020-12-07 DIAGNOSIS — M48.061 SPINAL STENOSIS OF LUMBAR REGION WITHOUT NEUROGENIC CLAUDICATION: ICD-10-CM

## 2020-12-07 DIAGNOSIS — M51.26 HNP (HERNIATED NUCLEUS PULPOSUS), LUMBAR: Primary | ICD-10-CM

## 2020-12-07 DIAGNOSIS — Z72.0 TOBACCO USE: ICD-10-CM

## 2020-12-07 PROCEDURE — 99213 OFFICE O/P EST LOW 20 MIN: CPT | Performed by: PHYSICAL MEDICINE & REHABILITATION

## 2020-12-07 RX ORDER — VARENICLINE TARTRATE 0.5 (11)-1
KIT ORAL
COMMUNITY
Start: 2020-11-16 | End: 2021-05-28

## 2020-12-07 RX ORDER — PREGABALIN 100 MG/1
100 CAPSULE ORAL
Qty: 90 CAP | Refills: 1 | Status: SHIPPED | OUTPATIENT
Start: 2020-12-07 | End: 2021-06-25 | Stop reason: SDUPTHER

## 2020-12-07 NOTE — PATIENT INSTRUCTIONS
Low Back Arthritis: Exercises  Introduction  Here are some examples of typical rehabilitation exercises for your condition. Start each exercise slowly. Ease off the exercise if you start to have pain. Your doctor or physical therapist will tell you when you can start these exercises and which ones will work best for you. When you are not being active, find a comfortable position for rest. Some people are comfortable on the floor or a medium-firm bed with a small pillow under their head and another under their knees. Some people prefer to lie on their side with a pillow between their knees. Don't stay in one position for too long. Take short walks (10 to 20 minutes) every 2 to 3 hours. Avoid slopes, hills, and stairs until you feel better. Walk only distances you can manage without pain, especially leg pain. How to do the exercises  Pelvic tilt   1. Lie on your back with your knees bent. 2. \"Brace\" your stomachtighten your muscles by pulling in and imagining your belly button moving toward your spine. 3. Press your lower back into the floor. You should feel your hips and pelvis rock back. 4. Hold for 6 seconds while breathing smoothly. 5. Relax and allow your pelvis and hips to rock forward. 6. Repeat 8 to 12 times. Back stretches   1. Get down on your hands and knees on the floor. 2. Relax your head and allow it to droop. Round your back up toward the ceiling until you feel a nice stretch in your upper, middle, and lower back. Hold this stretch for as long as it feels comfortable, or about 15 to 30 seconds. 3. Return to the starting position with a flat back while you are on your hands and knees. 4. Let your back sway by pressing your stomach toward the floor. Lift your buttocks toward the ceiling. 5. Hold this position for 15 to 30 seconds. 6. Repeat 2 to 4 times. Follow-up care is a key part of your treatment and safety.  Be sure to make and go to all appointments, and call your doctor if you are having problems. It's also a good idea to know your test results and keep a list of the medicines you take. Where can you learn more? Go to http://www.[employer].com/  Enter T094 in the search box to learn more about \"Low Back Arthritis: Exercises. \"  Current as of: March 2, 2020               Content Version: 12.6  © 2848-6217 [employer], [employer]. Care instructions adapted under license by [employer] (which disclaims liability or warranty for this information). If you have questions about a medical condition or this instruction, always ask your healthcare professional. [place] [address] any warranty or liability for your use of this information.

## 2020-12-07 NOTE — LETTER
12/7/20 Patient: Jp Light YOB: 1965 Date of Visit: 12/7/2020 Neil Reyes MD 
[address] [facility] [address] [facility] [address] VIA Facsimile: [phone] Dear Neil Reyes MD, Thank you for referring Mr. Cielo Farris to [address] [place] [address] for evaluation. My notes for this consultation are attached. If you have questions, please do not hesitate to call me. I look forward to following your patient along with you. Sincerely, Eb Vasquez MD

## 2021-05-04 ENCOUNTER — PATIENT MESSAGE (OUTPATIENT)
Dept: ORTHOPEDIC SURGERY | Age: 56
End: 2021-05-04

## 2021-05-04 NOTE — TELEPHONE ENCOUNTER
From: Keon Guaman  To:  Toi Wolff MD  Sent: 5/4/2021 5:22 PM EDT  Subject: Prescription Question    Please change my pharmacy to [address] [facility] is [facility]

## 2021-05-24 ENCOUNTER — OFFICE VISIT (OUTPATIENT)
Dept: ORTHOPEDIC SURGERY | Age: 56
End: 2021-05-24
Payer: COMMERCIAL

## 2021-05-24 VITALS
SYSTOLIC BLOOD PRESSURE: 96 MMHG | WEIGHT: 178.6 LBS | RESPIRATION RATE: 16 BRPM | HEIGHT: 67 IN | TEMPERATURE: 98 F | BODY MASS INDEX: 28.03 KG/M2 | HEART RATE: 67 BPM | OXYGEN SATURATION: 96 % | DIASTOLIC BLOOD PRESSURE: 57 MMHG

## 2021-05-24 DIAGNOSIS — Z72.0 TOBACCO USE: ICD-10-CM

## 2021-05-24 DIAGNOSIS — M48.061 SPINAL STENOSIS OF LUMBAR REGION WITHOUT NEUROGENIC CLAUDICATION: ICD-10-CM

## 2021-05-24 DIAGNOSIS — M54.16 LEFT LUMBAR RADICULOPATHY: ICD-10-CM

## 2021-05-24 DIAGNOSIS — M25.531 RIGHT WRIST PAIN: ICD-10-CM

## 2021-05-24 DIAGNOSIS — M51.26 HNP (HERNIATED NUCLEUS PULPOSUS), LUMBAR: ICD-10-CM

## 2021-05-24 PROBLEM — J44.9 CHRONIC OBSTRUCTIVE PULMONARY DISEASE (HCC): Status: ACTIVE | Noted: 2021-04-05

## 2021-05-24 PROCEDURE — 99213 OFFICE O/P EST LOW 20 MIN: CPT | Performed by: PHYSICAL MEDICINE & REHABILITATION

## 2021-05-24 NOTE — PROGRESS NOTES
[facility]  [address] [place], [address]  Phone: [phone]  Fax: [phone]    Pt's YOB: 1965    ASSESSMENT   Diagnoses and all orders for this visit:    1. HNP (herniated nucleus pulposus), lumbar    2. Right wrist pain  -     REFERRAL TO ORTHOPEDICS    3. Left lumbar radiculopathy    4. Spinal stenosis of lumbar region without neurogenic claudication    5. Tobacco use         IMPRESSION AND PLAN:  Keyshawn Zeng is a 63 y.o. right hand dominant male with history of cervical, thoracic, and lumbar pain and presents to the office today for follow up. He complains of pain in the lower back that radiates down the left leg to the knee. He takes Lyrica 100 mg QHS. 1) Pt was given information on low back exercises. 2) I will refer him to Dr. Mauricio Dominguez for his right wrist pain-- pt reports he has broken this wrist previously and his pain has worsened over the last 4 days after doing some work around the house  3) Pt will  his Lyrica medication today -- the pharmacy was contacted and he has refills from his PCP  4) Mr. Favian Cruz has a reminder for a \"due or due soon\" health maintenance. I have asked that he contact his primary care provider, Kayla Pascal MD, for follow-up on this health maintenance. 5)  demonstrated consistency with prescribing. 6) Last UDS from 10/29/18 was consistent. 7) Smoking cessation also recommended  Follow-up and Dispositions    · Return in about 2 months (around 7/24/2021) for Medication follow up. HISTORY OF PRESENT ILLNESS:  Keyshawn Zeng is a 63 y.o. right hand dominant male with history of cervical, thoracic, and lumbar pain and presents to the office today for follow up. He complains of pain in the lower back that radiates down the left leg to the knee as well as a recent development of vertigo.  Pt notes pain in the morning upon waking which generally improves with activity. At [address] [place] [address] he adjusted his Lyrica 50 mg (2)  to 100 mg QHS (with drowziness), however he notes he has not been able to take his medicine for the last 40 days as there was an error with his pharmacy. He did receive a message today that his medicine is available for pickup. He denies any daytime sedation with the Lyrica and notes that he sleeps better on the medication. Pt did see a spine specialist recently (who did see He had a chest CT) since his last office visit and notes that he was placed on oxygen at night. Pt was evaluated by the practice of Dr Eloy Hopper (a chiropractor) for his disability evaluation which he did receive. Pt feels an occasional shooting pain in his right arm that radiates from his wrist to his elbow that began when he was Swiffering his house approximately 4 days ago. He has had previous fracture in this wrist and notes his pain is affecting his ability to perform daily activities. Pt  Pt at this time desires to continue with current care and proceed with a referral to [facility]. Of note Pt has a PMHx of nephrolithiasis.     Pain Scale: 7/10    PCP: Zaira Orantes MD     Past Medical History:   Diagnosis Date    BPH with obstruction/lower urinary tract symptoms     Cancer [facility])     Prostate and colon    Chronic lung disease     Chronic obstructive pulmonary disease ([facility] [address]     Depression     ED (erectile dysfunction)     GERD (gastroesophageal reflux disease)     Hypercholesterolemia     Hypocitraturia     Kidney stones     Low back pain with radiation     Prostate cancer (HCC)     [id number] Sean 7 Prostate Cancer - s/p RRP with DaVinci by Dr. Alyce Dukes in 2011        Social History     Socioeconomic History    Marital status:      Spouse name: Not on file    Number of children: Not on file    Years of education: Not on file    Highest education level: Not on file   Occupational History    Occupation: working     Comment:    Tobacco Use    Smoking status: Current Every Day Smoker     Packs/day: 0.50     Years: 40.00     Pack years: 20.00     Types: Cigarettes     Last attempt to quit: 11/15/2015     Years since quittin.5    Smokeless tobacco: Former User     Quit date: 9/10/2015   Substance and Sexual Activity    Alcohol use: Yes     Comment:     Drug use: No    Sexual activity: Not on file   Other Topics Concern    Not on file   Social History Narrative    Not on file     Social Determinants of Health     Financial Resource Strain:     Difficulty of Paying Living Expenses:    Food Insecurity:     Worried About Running Out of Food in the Last Year:     [address] in the Last Year:    Transportation Needs:     Lack of Transportation (Medical):  Lack of Transportation (Non-Medical):    Physical Activity:     Days of Exercise per Week:     Minutes of Exercise per Session:    Stress:     Feeling of Stress :    Social Connections:     Frequency of Communication with Friends and Family:     Frequency of Social Gatherings with Friends and Family:     Attends Restoration Services:     Active Member of Clubs or Organizations:     Attends Club or Organization Meetings:     Marital Status:    Intimate Partner Violence:     Fear of Current or Ex-Partner:     Emotionally Abused:     Physically Abused:     Sexually Abused:        Current Outpatient Medications   Medication Sig Dispense Refill    Trelegy Ellipta 100-62.5-25 mcg inhaler INHALE 1 PUFF BY MOUTH EVERY DAY      Chantix Starting Month Box 0.5 mg (11)- 1 mg (42) DsPk FOLLOW PACKAGE DIRECTIONS PER PACKAGE      celecoxib (CeleBREX) 200 mg capsule Take 1 cap by mouth two (2) times daily (with meals) as needed for pain. 60 Cap 1    sertraline (ZOLOFT) 100 mg tablet Take 1.5 Tabs by mouth daily.       SYMBICORT 160-4.5 mcg/actuation HFAA INL 2 PFS PO BID IN THE MORNING AND IN THE SHANELL  5    ipratropium (ATROVENT) 0.03 % nasal spray U 2 SPRAYS IEN BID PRN      potassium citrate (UROCIT-K) 15 mEq TbER tablet Take 2 Tabs by mouth two (2) times daily (with meals). 360 Tab 3    gabapentin (NEURONTIN) 300 mg capsule Take 1 Cap by mouth nightly. 30 Cap 1    albuterol (PROAIR HFA) 90 mcg/actuation inhaler Take 2 Puffs by inhalation every four (4) hours as needed.  multivit-min/FA/lycopen/lutein (CENTRUM SILVER MEN PO) Take 1 Tab by mouth daily.  rosuvastatin (CRESTOR) 10 mg tablet Take 10 mg by mouth nightly.  pantoprazole (PROTONIX) 40 mg tablet Take 40 mg by mouth daily.  pregabalin (LYRICA) 100 mg capsule Take 1 Cap by mouth nightly. Max Daily Amount: 100 mg. (Patient not taking: Reported on 5/24/2021) 90 Cap 1    pregabalin (Lyrica) 50 mg capsule Take 1 cap by mouth in the evening for 1 week then increase to 2 as directed (Patient not taking: Reported on 5/24/2021) 60 Cap 1    omeprazole (PRILOSEC) 40 mg capsule TK 1 C PO QD (Patient not taking: Reported on 5/24/2021)      pravastatin (PRAVACHOL) 20 mg tablet Take 20 mg by mouth nightly. (Patient not taking: Reported on 5/24/2021)         No Known Allergies      REVIEW OF SYSTEMS    Constitutional: Negative for fever, chills, or weight change. Respiratory: Negative for cough or shortness of breath. Cardiovascular: Negative for chest pain or palpitations. Gastrointestinal: Negative for acid reflux, change in bowel habits, or constipation. Genitourinary: Negative for dysuria and flank pain. Musculoskeletal: Positive for right wrist pain and low back. Neurological: Positive for dizziness. Negative for headaches and numbness. Endo/Heme/Allergies: Negative for increased bruising. Psychiatric/Behavioral: Negative for difficulty with sleep.     As per HPI    PHYSICAL EXAMINATION  Visit Vitals  BP (!) 96/57   Pulse 67   Temp 98 °F (36.7 °C) (Tympanic)   Resp 16   Ht 5' 7\" (1.702 m)   Wt 178 lb 9.6 oz (81 kg)   SpO2 96%   BMI 27.97 kg/m²       Constitutional: Awake, alert, and in no acute distress. Neurological: 1+ symmetrical DTRs in the upper extremities. 1+ symmetrical DTRs in the lower extremities. Sensation to light touch is intact. Negative Evans's sign bilaterally. Skin: warm, dry, and intact. Musculoskeletal: No pain with extension, axial loading, or forward flexion. No pain with internal or external rotation of his hips. Negative straight leg raise bilaterally. Pain with extension and palpation of 3rd, 4th, and 5th metacarpal on the right. Also pain with supination and pronation on the ulnar side at the distal end of the right wrist.      Biceps  Triceps Deltoids Wrist Ext Wrist Flex Hand Intrin   Right +4/5 +4/5 +4/5 +4/5 +4/5 +4/5   Left +4/5 +4/5 +4/5 +4/5 +4/5 +4/5      Hip Flex  Quads Hamstrings Ankle DF EHL Ankle PF   Right +4/5 +4/5 +4/5 +4/5 +4/5 +4/5   Left +4/5 +4/5 +4/5 +4/5 +4/5 +4/5     IMAGING:    Lumbar MRI from 10/21/2021 was reviewed with the patient and demonstrated:    MRI Results (most recent):  Results from Orders Only encounter on 10/12/20    MRI LUMB SPINE WO CONT    Narrative  EXAM: Lumbar MRI without contrast    CLINICAL INDICATION: progressive lumbar pain with bilateral radicular symptom  sand difficulty standing/walking despite physical therapy and NSAID's.    TECHNIQUE: MRI of the lumbar spine without contrast obtained. Multiplanar  multisequence MR images of the lumbar spine obtained. IV Contrast: None    COMPARISON: 4/13/2015    FINDINGS:  All numbering assumes 5 lumbar type vertebrae. Postoperative Changes: None    Alignment:  Unremarkable. Osseous structures/Marrow: Unremarkable. .    The cord terminates at T12. No cord signal abnormalities appreciated. Retroperitoneum/Incidental: The abdominal aorta is without evidence of aneurysm. No paraaortic adenopathy appreciated. In the visualized portion of the right  kidney, there is a rounded structure which is 9 mm and may represent a cyst.    Lower Thoracic Spine:  The majority of the lower thoracic spine is only  visualized on the sagittal views. Minimal degenerative changes are noted. No  significant canal or neural foraminal stenosis. T12-L1: No significant canal or neural foraminal stenosis. L1-L2 level: No evidence of significant canal or neural foraminal stenosis. L2-L3: Minimal degenerative changes. No significant canal or neural foraminal  stenosis. L3-L4: Congenitally shortened pedicles are present at this level. A central disc  protrusion is noted. This is minimally progressed since prior imaging. Mild  canal narrowing is noted. Mild facet arthropathy are noted. Mild bilateral  neural foraminal narrowing is present. L4-L5: Congenitally shortened pedicles is noted. A central disc protrusion is  present. This is slightly progressed from prior imaging. There is narrowing of  the right lateral recess with likely compression of the descending right L5  nerve root. There is possible abutment of the left L5 nerve root in the left  lateral recess. No significant canal stenosis. Mild bilateral neural foraminal  narrowing. L5-S1: Broad-based disc bulge with a left paracentral disc extrusion is noted. The extrusion is mildly smaller in superior to inferior dimension compared with  most recent prior MR. There is compression of the descending left S1 nerve root  in the left lateral recess. The disc bulge at the base is slightly more  conspicuous. There is mild/moderate canal narrowing. There is abutment of the  descending S1 nerve roots as they cross the disc. Mild bilateral neural  foraminal narrowing is noted. Impression  Impression:  1. Left paracentral disc extrusion at L5-S1 is mildly smaller but still  compresses the left S1 nerve root in the left lateral recess. There is a  slightly more conspicuous disc bulge base at this level which abuts the  descending S1 nerve roots as they cross the disc.     2.  Mild progression at L4-L5 with likely compression of the right L5 nerve root  in the right lateral recess and possible on the left. 3.  Minimal progression in disease at L3-L4 without evidence of severe canal or  neural foraminal narrowing. Chest CT from 10/27/2021 was reviewed and demonstrated:  CT Results (most recent):  Results from [place] encounter on 10/27/20    CT CHEST HIGH RES WO CONT    Narrative  HIGH RESOLUTION CT CHEST    CPT CODE: 22392    COMPARISON: Multiple priors most recently Lilian 3, 2020. INDICATIONS: Dyspnea on the exertion. TECHNIQUE: Axial scanning of the chest is performed with high resolution thin  sections in both supine  and prone  inspiration and supine expiration. FINDINGS:    Severe emphysema. There are no groundglass infiltrates evident. There is fine subpleural reticulation associated with emphysematous spaces. Scattered subpleural traction bronchiolectasis is evident. .  Contiguous nodules in the right lower lobe are again noted approximately 5 mm in  diameter unchanged from 2018. .  The airways show decreased tubulation with some bronchiectasis in the right  lower lobe. .  Expiratory images show no air trapping. There are no pleural plaques, pneumothorax or pleural fluid. There is no mediastinal lymphadenopathy. There is no mediastinal mass. The heart and great vessels are unremarkable for  age. The included abdominal structures appear unremarkable. Impression  IMPRESSION:    There is subpleural reticulation increased in severity from 2019 with traction  bronchiolectasis and without honeycombing. There is severe emphysema. Some mild bronchiectasis without inflammatory changes evident in the right lower  lobe. Stable 5 mm nodules in the right lower lobe unchanged from 2018. Diffuse findings suggest the diagnosis of pulmonary fibrosis and emphysema  showing some increasing severity when compared with previous studies.     All CT scans at this facility are performed using dose optimization technique as  appropriate to the performed exam, to include automated exposure control,  adjustment of the mA and/or kV according to patient's size (Including  appropriate matching for site-specific examinations), or use of iterative  reconstruction technique. Written by Braydon Ponce, as dictated by Maritza Harley MD.  I, Dr. Maritza Harley confirm that all documentation is accurate.

## 2021-05-28 ENCOUNTER — OFFICE VISIT (OUTPATIENT)
Dept: ORTHOPEDIC SURGERY | Age: 56
End: 2021-05-28
Payer: COMMERCIAL

## 2021-05-28 VITALS
WEIGHT: 178 LBS | HEIGHT: 67 IN | TEMPERATURE: 97.6 F | BODY MASS INDEX: 27.94 KG/M2 | OXYGEN SATURATION: 99 % | HEART RATE: 56 BPM

## 2021-05-28 DIAGNOSIS — G56.21 CUBITAL TUNNEL SYNDROME, RIGHT: Primary | ICD-10-CM

## 2021-05-28 DIAGNOSIS — M79.641 RIGHT HAND PAIN: ICD-10-CM

## 2021-05-28 PROCEDURE — 99214 OFFICE O/P EST MOD 30 MIN: CPT | Performed by: ORTHOPAEDIC SURGERY

## 2021-05-28 PROCEDURE — 73130 X-RAY EXAM OF HAND: CPT | Performed by: ORTHOPAEDIC SURGERY

## 2021-05-28 NOTE — PROGRESS NOTES
Emilie Lim is a 63 y.o. male right handed unspecified employment. Worker's Compensation and legal considerations: none filed. Vitals:    05/28/21 0816   Pulse: (!) 56   Temp: 97.6 °F (36.4 °C)   TempSrc: Skin   SpO2: 99%   Weight: 178 lb (80.7 kg)   Height: 5' 7\" (1.702 m)   PainSc:   5   PainLoc: Hand           Chief Complaint   Patient presents with    Hand Pain     right, possible FX, has FXd hand 2 times prior         HPI: Patient presents today with a reported history of right hand fracture in the past.  He says he is here today for numbness and tingling in his little finger with shooting up the elbow. He says he felt something pop prior to this initial onset of pain.     Date of onset: Indeterminate    Injury: No    Prior Treatment:  No    Numbness/ Tingling: Yes: Comment: Right little finger      ROS: Review of Systems - General ROS: negative  Psychological ROS: negative  ENT ROS: negative  Allergy and Immunology ROS: negative  Hematological and Lymphatic ROS: negative  Respiratory ROS: no cough, shortness of breath, or wheezing  Cardiovascular ROS: no chest pain or dyspnea on exertion  Gastrointestinal ROS: no abdominal pain, change in bowel habits, or black or bloody stools  Musculoskeletal ROS: positive for - pain in hand - right  Neurological ROS: positive for - numbness/tingling  Dermatological ROS: negative    Past Medical History:   Diagnosis Date    BPH with obstruction/lower urinary tract symptoms     [facility])     Prostate and colon    Chronic lung disease     Chronic obstructive pulmonary disease (HCC)     Depression     ED (erectile dysfunction)     GERD (gastroesophageal reflux disease)     Hypercholesterolemia     Hypocitraturia     Kidney stones     Low back pain with radiation     Prostate cancer ([facility] [address]     [id number] Sean 7 Prostate Cancer - s/p RRP with DaVinci by Dr. Kiya Plunkett in 2011    Right hand fracture     multiple times       Past Surgical History:   Procedure Laterality Date    HX CHOLECYSTECTOMY      HX OTHER SURGICAL  2011    Colon resection and prostate surgery for cancer       Current Outpatient Medications   Medication Sig Dispense Refill    Trelegy Ellipta 100-62.5-25 mcg inhaler INHALE 1 PUFF BY MOUTH EVERY DAY      sertraline (ZOLOFT) 100 mg tablet Take 1.5 Tabs by mouth daily.  SYMBICORT 160-4.5 mcg/actuation HFAA INL 2 PFS PO BID IN THE MORNING AND IN THE SHANELL  5    ipratropium (ATROVENT) 0.03 % nasal spray U 2 SPRAYS IEN BID PRN      albuterol (PROAIR HFA) 90 mcg/actuation inhaler Take 2 Puffs by inhalation every four (4) hours as needed.  pantoprazole (PROTONIX) 40 mg tablet Take 40 mg by mouth daily.  Chantix Starting Month Box 0.5 mg (11)- 1 mg (42) DsPk FOLLOW PACKAGE DIRECTIONS PER PACKAGE      pregabalin (LYRICA) 100 mg capsule Take 1 Cap by mouth nightly. Max Daily Amount: 100 mg. (Patient not taking: Reported on 5/24/2021) 90 Cap 1    pregabalin (Lyrica) 50 mg capsule Take 1 cap by mouth in the evening for 1 week then increase to 2 as directed (Patient not taking: Reported on 5/24/2021) 60 Cap 1    celecoxib (CeleBREX) 200 mg capsule Take 1 cap by mouth two (2) times daily (with meals) as needed for pain. 60 Cap 1    omeprazole (PRILOSEC) 40 mg capsule TK 1 C PO QD (Patient not taking: Reported on 5/24/2021)      potassium citrate (UROCIT-K) 15 mEq TbER tablet Take 2 Tabs by mouth two (2) times daily (with meals). 360 Tab 3    gabapentin (NEURONTIN) 300 mg capsule Take 1 Cap by mouth nightly. 30 Cap 1    pravastatin (PRAVACHOL) 20 mg tablet Take 20 mg by mouth nightly. (Patient not taking: Reported on 5/24/2021)      multivit-min/FA/lycopen/lutein (CENTRUM SILVER MEN PO) Take 1 Tab by mouth daily.  rosuvastatin (CRESTOR) 10 mg tablet Take 10 mg by mouth nightly. No Known Allergies        PE:     Physical Exam  Vitals and nursing note reviewed. Constitutional:       General: He is not in acute distress. Appearance: Normal appearance. He is not ill-appearing. Cardiovascular:      Pulses: Normal pulses. Pulmonary:      Effort: Pulmonary effort is normal.   Musculoskeletal:         General: Tenderness present. No swelling, deformity or signs of injury. Normal range of motion. Cervical back: Normal range of motion and neck supple. Right lower leg: No edema. Left lower leg: No edema. Skin:     General: Skin is warm and dry. Capillary Refill: Capillary refill takes less than 2 seconds. Findings: No bruising or erythema. Neurological:      General: No focal deficit present. Mental Status: He is alert and oriented to person, place, and time. Psychiatric:         Mood and Affect: Mood normal.         Behavior: Behavior normal.            NEUROVASCULAR    Examination L R Examination L R   Carpal Comp. - - Pronator Comp. - -   Carpal Tinel - - Pronator Tinel - -   Phalen's - - Pronator Stress - -   Cubital Comp. - + Guyon Comp. - -   Cubital Tinel - + Guyon Tinel - -   Elbow Hyperflexion - + Adson's - -   Spurling's - - SC Comp. - -   PCB Median abn - - SC Tinel - -   Radial Tinel - - IC Comp. - -   Digital Tinel - - IC Tinel - -   Radial 2-Pt WNL WNL Ulnar 2-Pt WNL WNL     Radial Pulse: 2+  Capillary Refill: < 2 sec  Timothy: Not Performed  Digital Erin Pry: Not Performed        Imagin2021 3 views of right hand does not show any fracture dislocation or other osseous abnormalities. There are minimal degenerative changes. ICD-10-CM ICD-9-CM    1. Cubital tunnel syndrome, right  G56.21 354. 2 EMG ONE EXTREMITY UPPER RT      NCV/LAT MOTOR PER NERVE UP/RT   2. Right hand pain  M79.641 729.5 AMB POC XRAY, HAND; 3+ VIEWS         Plan:     Right upper extremity EMG. Follow-up and Dispositions    · Return for EMG review.           Plan was reviewed with patient, who verbalized agreement and understanding of the plan

## 2021-06-04 ENCOUNTER — OFFICE VISIT (OUTPATIENT)
Dept: ORTHOPEDIC SURGERY | Age: 56
End: 2021-06-04
Payer: COMMERCIAL

## 2021-06-04 VITALS
HEART RATE: 62 BPM | WEIGHT: 179 LBS | DIASTOLIC BLOOD PRESSURE: 70 MMHG | OXYGEN SATURATION: 99 % | BODY MASS INDEX: 28.09 KG/M2 | SYSTOLIC BLOOD PRESSURE: 118 MMHG | HEIGHT: 67 IN | TEMPERATURE: 98.1 F

## 2021-06-04 DIAGNOSIS — R20.2 NUMBNESS AND TINGLING IN RIGHT HAND: Primary | ICD-10-CM

## 2021-06-04 DIAGNOSIS — R94.131 ABNORMAL EMG: ICD-10-CM

## 2021-06-04 DIAGNOSIS — R20.2 NUMBNESS AND TINGLING IN RIGHT HAND: ICD-10-CM

## 2021-06-04 DIAGNOSIS — R20.0 NUMBNESS AND TINGLING IN RIGHT HAND: ICD-10-CM

## 2021-06-04 DIAGNOSIS — G56.21 CUBITAL TUNNEL SYNDROME, RIGHT: ICD-10-CM

## 2021-06-04 DIAGNOSIS — R20.0 NUMBNESS AND TINGLING IN RIGHT HAND: Primary | ICD-10-CM

## 2021-06-04 PROCEDURE — 95886 MUSC TEST DONE W/N TEST COMP: CPT | Performed by: PHYSICAL MEDICINE & REHABILITATION

## 2021-06-04 PROCEDURE — 95909 NRV CNDJ TST 5-6 STUDIES: CPT | Performed by: PHYSICAL MEDICINE & REHABILITATION

## 2021-06-04 RX ORDER — TAMSULOSIN HYDROCHLORIDE 0.4 MG/1
CAPSULE ORAL
COMMUNITY
Start: 2021-05-26

## 2021-06-04 NOTE — PROGRESS NOTES
Yoselyn Glaser presents today for   Chief Complaint   Patient presents with    Arm Pain     right       Is someone accompanying this pt? no    Is the patient using any DME equipment during OV? no    Depression Screening:  3 most recent PHQ Screens 12/7/2020   PHQ Not Done Patient Decline   Little interest or pleasure in doing things -   Feeling down, depressed, irritable, or hopeless -   Total Score PHQ 2 -       Learning Assessment:  Learning Assessment 6/19/2017   PRIMARY LEARNER Patient   PRIMARY LANGUAGE ENGLISH   LEARNER PREFERENCE PRIMARY LISTENING   ANSWERED BY patient   RELATIONSHIP SELF       Abuse Screening:  Abuse Screening Questionnaire 9/8/2020   Do you ever feel afraid of your partner? N   Are you in a relationship with someone who physically or mentally threatens you? N   Is it safe for you to go home? Y         OPIOID RISK TOOL  Opioid Risk Tool 2/8/2018   Personal history of alcohol abuse? 0   Personal history of illegal drug abuse? 0   Personal history of prescription drug abuse? 0   Age range between 17-45? 0   History of preadolescent sexual abuse? 0   ADD, OCD, bipolar, schizophrenia? 0   Depression? 0   Family history of alcohol abuse? 0   Family history of illegal drug abuse? 0   Family history of prescription drug abuse? 0   Opioid Risk Tool Total Score 0       Coordination of Care:  1. Have you been to the ER, urgent care clinic since your last visit? no  Hospitalized since your last visit? no    2. Have you seen or consulted any other health care providers outside of the [address] [place] [address] since your last visit? Yes, ortho Include any pap smears or colon screening.  no

## 2021-06-04 NOTE — PROGRESS NOTES
[facility] [address]  [address], [address]  [place], [address] [place] [address] Street  Phone: [phone]  Fax: [phone]        Smiley Mcmillan  : 1965  PCP: Flavia Barrientos MD  2021    ELECTROMYOGRAPHY AND NERVE CONDUCTION STUDIES    Jett Harris was referred by Dr. Delvin Valladares for electrodiagnostic evaluation of right hand numbness and tingling. NCV & EMG Findings:    All nerve conduction studies (as indicated in the following tables) were within normal limits. His ulnar nerve conduction velocity between the forearm and the elbow shows significant difference with segmental slowing at the elbow. All examined muscles (as indicated in the following table) showed no evidence of electrical instability. INTERPRETATION    This is an abnormal electrodiagnostic examination. These findings may be consistent with:   1. Mild ulnar mononeuropathy at the right elbow (cubital tunnel syndrome)    There is no electrodiagnostic evidence of any cervical radiculopathy, brachial plexopathy, peripheral polyneuropathy, or any other mononeuropathy. CLINICAL INTERPRETATION    His electrodiagnostic finding of cubital tunnel syndrome is consistent with some of his right arm symptoms. HISTORY OF PRESENT ILLNESS  Jett Harris is a 63 y.o. male. Pt presents today for RUE EMG evaluation of right hand numbness and tingling in the little finger shooting up the elbow.     PAST MEDICAL HISTORY   Past Medical History:   Diagnosis Date    BPH with obstruction/lower urinary tract symptoms     Cancer [facility])     Prostate and colon    Chronic lung disease     [facility] disease ([facility] [address] [facility]     Depression     ED (erectile dysfunction)     GERD (gastroesophageal reflux disease)     Hypercholesterolemia     Hypocitraturia     Kidney stones     Low back pain with radiation     Prostate cancer ([facility] [address]     [id number] Sean 7 Prostate Cancer - s/p RRP with DaVinci by Dr. Fabienne Vences in     Right hand fracture     multiple times       Past Surgical History:   Procedure Laterality Date    HX CHOLECYSTECTOMY      HX OTHER SURGICAL  2011    Colon resection and prostate surgery for cancer   . MEDICATIONS    Current Outpatient Medications   Medication Sig Dispense Refill    Trelegy Ellipta 100-62.5-25 mcg inhaler INHALE 1 PUFF BY MOUTH EVERY DAY      pregabalin (LYRICA) 100 mg capsule Take 1 Cap by mouth nightly. Max Daily Amount: 100 mg. (Patient not taking: Reported on 2021) 90 Cap 1    sertraline (ZOLOFT) 100 mg tablet Take 1.5 Tabs by mouth daily.  SYMBICORT 160-4.5 mcg/actuation HFAA INL 2 PFS PO BID IN THE MORNING AND IN THE SHANELL  5    ipratropium (ATROVENT) 0.03 % nasal spray U 2 SPRAYS IEN BID PRN      albuterol (PROAIR HFA) 90 mcg/actuation inhaler Take 2 Puffs by inhalation every four (4) hours as needed.  pravastatin (PRAVACHOL) 20 mg tablet Take 20 mg by mouth nightly. (Patient not taking: Reported on 2021)      pantoprazole (PROTONIX) 40 mg tablet Take 40 mg by mouth daily.             ALLERGIES  No Known Allergies       SOCIAL HISTORY    Social History     Socioeconomic History    Marital status:      Spouse name: Not on file    Number of children: Not on file    Years of education: Not on file    Highest education level: Not on file   Occupational History    Occupation: working     Comment:    Tobacco Use    Smoking status: Current Every Day Smoker     Packs/day: 0.50     Years: 40.00     Pack years: 20.00     Types: Cigarettes     Last attempt to quit: 11/15/2015     Years since quittin.5    Smokeless tobacco: Former User     Quit date: 9/10/2015   Substance and Sexual Activity    Alcohol use: Yes     Comment:     Drug use: No     Social Determinants of Health     Financial Resource Strain:     Difficulty of Paying Living Expenses:    Food Insecurity:     Worried About Running Out of Food in the Last Year:     Ran Out of Food in the Last Year:    Transportation Needs:     Lack of Transportation (Medical):  Lack of Transportation (Non-Medical):    Physical Activity:     Days of Exercise per Week:     Minutes of Exercise per Session:    Stress:     Feeling of Stress :    Social Connections:     Frequency of Communication with Friends and Family:     Frequency of Social Gatherings with Friends and Family:     Attends Jain Services:     Active Member of Clubs or Organizations:     Attends Club or Organization Meetings:     Marital Status:        FAMILY HISTORY  Family History   Problem Relation Age of Onset    Hypertension Mother     Cancer Father         colon    Hypertension Brother     Cancer Maternal Grandfather          PHYSICAL EXAMINATION  There were no vitals taken for this visit. Pain Assessment  5/28/2021   Location of Pain Hand   Location Modifiers Right   Severity of Pain 5   Quality of Pain Throbbing   Quality of Pain Comment -   Duration of Pain Persistent   Duration of Pain Comment -   Frequency of Pain Constant   Frequency of Pain Comment -   Aggravating Factors Other (Comment)   Aggravating Factors Comment using hand   Limiting Behavior Some   Relieving Factors Rest;Ice   Relieving Factors Comment -   Result of Injury Yes   Work-Related Injury No   Type of Injury Other (Comment)   Type of Injury Comment just sweeping and heard a pop           Constitutional:  Well developed, well nourished, in no acute distress. Psychiatric: Affect and mood are appropriate. Integumentary: No rashes or abrasions noted on exposed areas. SPINE/MUSCULOSKELETAL EXAM    On brief examination: None.       NCV & EMG Findings:  Nerve Conduction Studies  Anti Sensory Summary Table     Stim Site NR Peak (ms) Norm Peak (ms) O-P Amp (µV) Norm O-P Amp Site1 Site2 Delta-P (ms) Dist (cm) Sanjay (m/s) Norm Sanjay (m/s)   Right Median Anti Sensory (2nd Digit)   Wrist    3.3 <4 15.5 >13 Wrist 2nd Digit 3. 3 14.0 42 >39   Right Radial Anti Sensory (Base 1st Digit)   Wrist    2.3 2.8 23.2 11 Wrist Base 1st Digit 2.3 10.0 43    Right Ulnar Anti Sensory (5th Digit)   Wrist    3.3 <4.0 13.7 >9 Wrist 5th Digit 3.3 14.0 42 >38     Motor Summary Table     Stim Site NR Onset (ms) Norm Onset (ms) O-P Amp (mV) Norm O-P Amp Site1 Site2 Delta-0 (ms) Dist (cm) Sanjay (m/s) Norm Snajay (m/s)   Right Median Motor (Abd Poll Brev)   Wrist    3.5 <4.5 7.9 >4.1 Elbow Wrist 4.5 23.5 52 >49   Elbow    8.0  7.4          Right Ulnar Motor (Abd Dig Min)   Wrist    3.0 <3.7 9.7 >7.9 B Elbow Wrist 2.8 18.5 66 >52   B Elbow    5.8  8.7  A Elbow B Elbow 2.3 10.0 43 >43   A Elbow    8.1  8.3            EMG     Side Muscle Nerve Root Ins Act Fibs Psw Amp Dur Poly Recrt Int [place] Comment   Right Biceps Musculocut C5-6 Nml Nml Nml Nml Nml 0 Nml Nml    Right Triceps Radial C6-7-8 Nml Nml Nml Nml Nml 0 Nml Nml    Right PronatorTeres Median C6-7 Nml Nml Nml Nml Nml 0 Nml Nml    Right Abd Poll Brev Median C8-T1 Nml Nml Nml Nml Nml 0 Nml Nml    Right 1stDorInt Ulnar C8-T1 Nml Nml Nml Nml Nml 0 Nml Nml        Nerve Conduction Studies  Anti Sensory Left/Right Comparison     Stim Site L Lat (ms) R Lat (ms) L-R Lat (ms) L Amp (µV) R Amp (µV) L-R Amp (%) Site1 Site2 L Sanjay (m/s) R Sanjay (m/s) L-R Sanjay (m/s)   Median Anti Sensory (2nd Digit)   Wrist  3.3   15.5  Wrist 2nd Digit  42    Radial Anti Sensory (Base 1st Digit)   Wrist  2.3   23.2  Wrist Base 1st Digit  43    Ulnar Anti Sensory (5th Digit)   Wrist  3.3   13.7  Wrist 5th Digit  42      Motor Left/Right Comparison     Stim Site L Lat (ms) R Lat (ms) L-R Lat (ms) L Amp (mV) R Amp (mV) L-R Amp (%) Site1 Site2 L Sanjay (m/s) R Sanjay (m/s) L-R Sanjay (m/s)   Median Motor (Abd Poll Brev)   Wrist  3.5   7.9  Elbow Wrist  52    Elbow  8.0   7.4         Ulnar Motor (Abd Dig Min)   Wrist  3.0   9.7  B Elbow Wrist  66    B Elbow  5.8   8.7  A Elbow B Elbow  43    A Elbow  8.1   8.3               Waveforms:                     [facility]  OFFICE PROCEDURE PROGRESS NOTE        Chart reviewed for the following:   I, Romi Roberts, have reviewed the History, Physical and updated the Allergic reactions for [address] performed immediately prior to start of procedure:   Joana Brunson, have performed the following reviews on Yoselyn Glaser prior to the start of the procedure:            * Patient was identified by name and date of birth   * Agreement on procedure being performed was verified  * Risks and Benefits explained to the patient  * Procedure site verified and marked as necessary  * Patient was positioned for comfort  * Consent was signed and verified     Time: 10:58 AM    Date of procedure: 6/4/2021    Procedure performed by:  Masoud Blackmon MD    Provider accompanied by: Harley. Patient accompanied by: Self.     How tolerated by patient: tolerated the procedure well with no complications    Post Procedural Pain Scale: 0 - No Hurt    Comments: none    Written by Morena Fuentes as dictated by Romi Roberts MD

## 2021-06-10 ENCOUNTER — OFFICE VISIT (OUTPATIENT)
Dept: ORTHOPEDIC SURGERY | Age: 56
End: 2021-06-10
Payer: COMMERCIAL

## 2021-06-10 VITALS
HEART RATE: 66 BPM | RESPIRATION RATE: 15 BRPM | HEIGHT: 67 IN | WEIGHT: 179 LBS | BODY MASS INDEX: 28.09 KG/M2 | OXYGEN SATURATION: 98 %

## 2021-06-10 DIAGNOSIS — G56.21 CUBITAL TUNNEL SYNDROME, RIGHT: Primary | ICD-10-CM

## 2021-06-10 PROCEDURE — 99214 OFFICE O/P EST MOD 30 MIN: CPT | Performed by: ORTHOPAEDIC SURGERY

## 2021-06-10 NOTE — PROGRESS NOTES
Zackery Good is a 63 y.o. male right handed unspecified employment. Worker's Compensation and legal considerations: none filed. Vitals:    06/10/21 0844   Pulse: 66   Resp: 15   SpO2: 98%   Weight: 179 lb (81.2 kg)   Height: 5' 7\" (1.702 m)   PainSc:   7   PainLoc: Hand           Chief Complaint   Patient presents with    Hand Pain     emg results for right hand       HPI: Patient presents today for right upper extremity EMG follow-up. He reports continued numbness in the little finger and ring finger. Initial HPI: Patient presents today with a reported history of right hand fracture in the past.  He says he is here today for numbness and tingling in his little finger with shooting up the elbow. He says he felt something pop prior to this initial onset of pain.     Date of onset: Indeterminate    Injury: No    Prior Treatment:  No    Numbness/ Tingling: Yes: Comment: Right little finger      ROS: Review of Systems - General ROS: negative  Psychological ROS: negative  ENT ROS: negative  Allergy and Immunology ROS: negative  Hematological and Lymphatic ROS: negative  Respiratory ROS: no cough, shortness of breath, or wheezing  Cardiovascular ROS: no chest pain or dyspnea on exertion  Gastrointestinal ROS: no abdominal pain, change in bowel habits, or black or bloody stools  Musculoskeletal ROS: positive for - pain in hand - right  Neurological ROS: positive for - numbness/tingling  Dermatological ROS: negative    Past Medical History:   Diagnosis Date    BPH with obstruction/lower urinary tract symptoms     Cancer ([facility] [address]     Prostate and colon    Chronic lung disease     Chronic obstructive pulmonary disease (HCC)     Depression     ED (erectile dysfunction)     GERD (gastroesophageal reflux disease)     Hypercholesterolemia     Hypocitraturia     Kidney stones     Low back pain with radiation     Prostate cancer ([address]     [id number] Sean 7 Prostate Cancer - s/p RRP with DaVinci by Dr. Cindy Dudley in 2011    Right hand fracture     multiple times       Past Surgical History:   Procedure Laterality Date    HX CHOLECYSTECTOMY      HX OTHER SURGICAL  2011    Colon resection and prostate surgery for cancer       Current Outpatient Medications   Medication Sig Dispense Refill    tamsulosin (FLOMAX) 0.4 mg capsule TAKE 1 CAPSULE BY MOUTH EVERY DAY AFTER DINNER      Trelegy Ellipta 100-62.5-25 mcg inhaler INHALE 1 PUFF BY MOUTH EVERY DAY      pregabalin (LYRICA) 100 mg capsule Take 1 Cap by mouth nightly. Max Daily Amount: 100 mg. 90 Cap 1    sertraline (ZOLOFT) 100 mg tablet Take 1.5 Tabs by mouth daily.  SYMBICORT 160-4.5 mcg/actuation HFAA INL 2 PFS PO BID IN THE MORNING AND IN THE SHANELL  5    ipratropium (ATROVENT) 0.03 % nasal spray U 2 SPRAYS IEN BID PRN      albuterol (PROAIR HFA) 90 mcg/actuation inhaler Take 2 Puffs by inhalation every four (4) hours as needed.  pantoprazole (PROTONIX) 40 mg tablet Take 40 mg by mouth daily.  pravastatin (PRAVACHOL) 20 mg tablet Take 20 mg by mouth nightly. (Patient not taking: Reported on 6/10/2021)         No Known Allergies        PE:     Physical Exam  Vitals and nursing note reviewed. Constitutional:       General: He is not in acute distress. Appearance: Normal appearance. He is not ill-appearing. Cardiovascular:      Pulses: Normal pulses. Pulmonary:      Effort: Pulmonary effort is normal.   Musculoskeletal:         General: Tenderness present. No swelling, deformity or signs of injury. Normal range of motion. Cervical back: Normal range of motion and neck supple. Right lower leg: No edema. Left lower leg: No edema. Skin:     General: Skin is warm and dry. Capillary Refill: Capillary refill takes less than 2 seconds. Findings: No bruising or erythema. Neurological:      General: No focal deficit present. Mental Status: He is alert and oriented to person, place, and time.    Psychiatric:         Mood and Affect: Mood normal.         Behavior: Behavior normal.            NEUROVASCULAR    Examination L R Examination L R   Carpal Comp. - - Pronator Comp. - -   Carpal Tinel - - Pronator Tinel - -   Phalen's - - Pronator Stress - -   Cubital Comp. - + Guyon Comp. - -   Cubital Tinel - + Guyon Tinel - -   Elbow Hyperflexion - + Adson's - -   Spurling's - - SC Comp. - -   PCB Median abn - - SC Tinel - -   Radial Tinel - - IC Comp. - -   Digital Tinel - - IC Tinel - -   Radial 2-Pt WNL WNL Ulnar 2-Pt WNL WNL     Radial Pulse: 2+  Capillary Refill: < 2 sec  Tim[employer]: Not Performed  [employer]: Not Performed      NCV & EMG Findings:     All nerve conduction studies (as indicated in the following tables) were within normal limits. His ulnar nerve conduction velocity between the forearm and the elbow shows significant difference with segmental slowing at the elbow.      All examined muscles (as indicated in the following table) showed no evidence of electrical instability.       INTERPRETATION     This is an abnormal electrodiagnostic examination. These findings may be consistent with:   1. Mild ulnar mononeuropathy at the right elbow (cubital tunnel syndrome)     There is no electrodiagnostic evidence of any cervical radiculopathy, brachial plexopathy, peripheral polyneuropathy, or any other mononeuropathy.     CLINICAL INTERPRETATION     His electrodiagnostic finding of cubital tunnel syndrome is consistent with some of his right arm symptoms. [id number] 3 views of right hand does not show any fracture dislocation or other osseous abnormalities. There are minimal degenerative changes. ICD-10-CM ICD-9-CM    1. Cubital tunnel syndrome, right  G56.21 354.2 SCHEDULE SURGERY         Plan:     Discussed operative and nonoperative treatment and patient would like to proceed with release of his cubital tunnel.     The patient was counseled at length about the risks of isidro Covid-19 during their perioperative period and any recovery window from their procedure. The patient was made aware that isidro Covid-19  may worsen their prognosis for recovering from their procedure and lend to a higher morbidity and/or mortality risk. All material risks, benefits, and reasonable alternatives including postponing the procedure were discussed. The patient does  wish to proceed with the procedure at this time. This procedure has been fully reviewed with the patient and written informed consent has been obtained. Follow-up and Dispositions    · Return for 2 weeks postop.           Plan was reviewed with patient, who verbalized agreement and understanding of the plan

## 2021-06-10 NOTE — LETTER
Patient: Benitez Raphael PROCEDURE: Right Cubital Tunnel Release PRE OPERATIVE INSTRUCTIONS: 
Five (5) days prior to surgery STOP taking any hormonal medications, aspirin, fish oil and/or anti-inflammatory medications. If you are taking blood thinner medication (such as Coumadin, Plavix, Heparin or others) you will need special instructions from the prescribing physician. LABS: Expect a call from a PAT nurse to complete a health assessment. Report to [facility] when instructed by the PAT nurse for bloodwork, EKG and COVID testing. This will likely be scheduled on Thursday, 6/24/2021.  
o You may not eat or drink anything after midnight the night before  
o If required labs and EKG are not completed Surgery will be canceled. Surgery Date: 6/29/2021  Time: 11:00am 
Report to [facility] on the [address] [place] [address] at: [address]:  
      1. Do not eat, chew gum or drink anything after midnight prior to the date of your surgery. 2. Take your blood pressure and/or heart medications with small sips of water unless otherwise instructed. If you are insulin dependent, bring your insulin with you, unless otherwise instructed. 3. Bring a list of your medications and the dosage to the hospital including  vitamins. 4. Do not wear nail polish, make-up, jewelry, perfumes or skin creams. 5. Do not bring valuables or money to the hospital. 
      6. You MUST have a responsible adult arranged to drive you home following surgery. This person will NOT be allowed to wait inside the hospital during your surgery. Please have good contact information available to give the hospital. It is recommended that they stay with you 24 hours after surgery. Post op visit  appointment is scheduled with Dr. Schwartz Links   
   on 7/12/2021 @ 9:15am at the [facility] office. Ada Luevano, Surgery Scheduler  [phone]

## 2021-06-16 ENCOUNTER — NURSE NAVIGATOR (OUTPATIENT)
Dept: OTHER | Age: 56
End: 2021-06-16

## 2021-06-16 NOTE — PROGRESS NOTES
Referring Provider: Lula Keane MD      Lung Cancer Risk Profile:   Age: 63  Gender: Male  Height: 67\"  Weight: 179#    Smoking History:  Smoking Status: current use  # years smokin  # years quit: 0  Packs/day: 2  Pack years: 80    Patient discussed smoking cessation with PCP: Yes, per provider note    Patient participated in shared decision making process with PCP: Unknown    Patient is currently experiencing symptoms: No    If yes what symptoms:     Co-Morbidities:  COPD, CAD    Cancer History:  Colon, prostate    Additional Risk Factors:     Grandfather with lung cancer, exposure to second hand smoke, occupational exposure to asbestos      Patient's smoking history verified via EMR and provider note. Patient meets LDCT lung cancer screening criteria.        ORACIO KrauseN, RN, OCN  Lung Health Nurse Navigator

## 2021-06-18 ENCOUNTER — HOSPITAL ENCOUNTER (OUTPATIENT)
Dept: CT IMAGING | Age: 56
Discharge: HOME | End: 2021-06-18
Attending: INTERNAL MEDICINE
Payer: COMMERCIAL

## 2021-06-18 DIAGNOSIS — Z87.891 PERSONAL HISTORY OF TOBACCO USE, PRESENTING HAZARDS TO HEALTH: ICD-10-CM

## 2021-06-18 PROCEDURE — 71271 CT THORAX LUNG CANCER SCR C-: CPT

## 2021-06-22 DIAGNOSIS — Z01.818 PREOP EXAMINATION: Primary | ICD-10-CM

## 2021-06-24 ENCOUNTER — HOSPITAL ENCOUNTER (OUTPATIENT)
Dept: PREADMISSION TESTING | Age: 56
Discharge: HOME | End: 2021-06-24
Payer: COMMERCIAL

## 2021-06-24 ENCOUNTER — HOSPITAL ENCOUNTER (OUTPATIENT)
Dept: LAB | Age: 56
Discharge: HOME | End: 2021-06-24

## 2021-06-24 ENCOUNTER — PATIENT MESSAGE (OUTPATIENT)
Dept: ORTHOPEDIC SURGERY | Age: 56
End: 2021-06-24

## 2021-06-24 DIAGNOSIS — M54.16 LEFT LUMBAR RADICULOPATHY: ICD-10-CM

## 2021-06-24 DIAGNOSIS — Z01.818 PREOP EXAMINATION: ICD-10-CM

## 2021-06-24 LAB
A-G RATIO,AGRAT: 1.4 RATIO (ref 1.1–2.6)
ABSOLUTE LYMPHOCYTE COUNT, 10803: 2.3 K/UL (ref 1–4.8)
ALBUMIN SERPL-MCNC: 4.2 G/DL (ref 3.5–5)
ALP SERPL-CCNC: 100 U/L (ref 25–115)
ALT SERPL-CCNC: 13 U/L (ref 5–40)
ANION GAP SERPL CALC-SCNC: 10.3 MMOL/L (ref 3–15)
AST SERPL W P-5'-P-CCNC: 20 U/L (ref 10–37)
ATRIAL RATE: 55 BPM
BASOPHILS # BLD: 0.1 K/UL (ref 0–0.2)
BASOPHILS NFR BLD: 1 % (ref 0–2)
BILIRUB SERPL-MCNC: 0.3 MG/DL (ref 0.2–1.2)
BUN SERPL-MCNC: 16 MG/DL (ref 6–22)
CALCIUM SERPL-MCNC: 9.7 MG/DL (ref 8.4–10.5)
CALCULATED P AXIS, ECG09: 51 DEGREES
CALCULATED R AXIS, ECG10: 23 DEGREES
CALCULATED T AXIS, ECG11: 48 DEGREES
CHLORIDE SERPL-SCNC: 98 MMOL/L (ref 98–110)
CO2 SERPL-SCNC: 29 MMOL/L (ref 20–32)
CREAT SERPL-MCNC: 1 MG/DL (ref 0.5–1.2)
DIAGNOSIS, 93000: NORMAL
EOSINOPHIL # BLD: 0.2 K/UL (ref 0–0.5)
EOSINOPHIL NFR BLD: 3 % (ref 0–6)
ERYTHROCYTE [DISTWIDTH] IN BLOOD BY AUTOMATED COUNT: 13.2 % (ref 10–15.5)
GFRAA, 66117: >60
GFRNA, 66118: >60
GLOBULIN,GLOB: 3 G/DL (ref 2–4)
GLUCOSE SERPL-MCNC: 84 MG/DL (ref 70–99)
GRANULOCYTES,GRANS: 53 % (ref 40–75)
HCT VFR BLD AUTO: 46.8 % (ref 39.3–51.6)
HGB BLD-MCNC: 15 G/DL (ref 13.1–17.2)
LYMPHOCYTES, LYMLT: 35 % (ref 20–45)
MCH RBC QN AUTO: 29 PG (ref 26–34)
MCHC RBC AUTO-ENTMCNC: 32 G/DL (ref 31–36)
MCV RBC AUTO: 90 FL (ref 80–95)
MONOCYTES # BLD: 0.4 K/UL (ref 0.1–1)
MONOCYTES NFR BLD: 7 % (ref 3–12)
NEUTROPHILS # BLD AUTO: 3.6 K/UL (ref 1.8–7.7)
P-R INTERVAL, ECG05: 130 MS
PLATELET # BLD AUTO: 214 K/UL (ref 140–440)
PMV BLD AUTO: 10.5 FL (ref 9–13)
POTASSIUM SERPL-SCNC: 4.8 MMOL/L (ref 3.5–5.5)
PROT SERPL-MCNC: 7.2 G/DL (ref 6.4–8.3)
Q-T INTERVAL, ECG07: 442 MS
QRS DURATION, ECG06: 82 MS
QTC CALCULATION (BEZET), ECG08: 422 MS
RBC # BLD AUTO: 5.18 M/UL (ref 3.8–5.8)
SENTARA SPECIMEN COL,SENBCF: NORMAL
SODIUM SERPL-SCNC: 137 MMOL/L (ref 133–145)
VENTRICULAR RATE, ECG03: 55 BPM
WBC # BLD AUTO: 6.7 K/UL (ref 4–11)

## 2021-06-24 PROCEDURE — U0003 INFECTIOUS AGENT DETECTION BY NUCLEIC ACID (DNA OR RNA); SEVERE ACUTE RESPIRATORY SYNDROME CORONAVIRUS 2 (SARS-COV-2) (CORONAVIRUS DISEASE [COVID-19]), AMPLIFIED PROBE TECHNIQUE, MAKING USE OF HIGH THROUGHPUT TECHNOLOGIES AS DESCRIBED BY CMS-2020-01-R: HCPCS

## 2021-06-24 PROCEDURE — 93005 ELECTROCARDIOGRAM TRACING: CPT

## 2021-06-24 PROCEDURE — 99001 SPECIMEN HANDLING PT-LAB: CPT

## 2021-06-25 ENCOUNTER — TELEPHONE (OUTPATIENT)
Dept: ORTHOPEDIC SURGERY | Age: 56
End: 2021-06-25

## 2021-06-25 LAB — SARS-COV-2, COV2NT: NOT DETECTED

## 2021-06-25 RX ORDER — PREGABALIN 100 MG/1
100 CAPSULE ORAL
Qty: 90 CAPSULE | Refills: 1 | Status: SHIPPED | OUTPATIENT
Start: 2021-06-25

## 2021-06-25 NOTE — TELEPHONE ENCOUNTER
From: Gina Lopez  To:  Shruthi Ma MD  Sent: 6/24/2021 9:29 PM EDT  Subject: Prescription Question    I was wondering if you could renew my prescription for pregabalin please I don't have any refills left and [facility] wrong doctor if you have any questions my phone number is [phone]   Thank you

## 2021-06-25 NOTE — TELEPHONE ENCOUNTER
Gem (?) from [facility] called stating the patient's Covid swab was cancelled because they didn't receive a specimen so he still needs to get this done before his surgery with Dr. Natasha Drummond. She wanted to make us aware. The main number for [facility] was left as the call back number [phone].

## 2021-06-28 ENCOUNTER — DOCUMENTATION ONLY (OUTPATIENT)
Dept: ORTHOPEDIC SURGERY | Age: 56
End: 2021-06-28

## 2021-06-28 NOTE — PROGRESS NOTES
Patient's surgery scheduled for 6/29/21 cancelled. Patient was denied clearance by pulmonology and cardiology. Patient stated he needed to have some testing done and he hopes to have surgery in the Fall. Patient advised to schedule an appt when ready to reschedule surgery. FMED

## 2021-10-20 ENCOUNTER — OFFICE VISIT (OUTPATIENT)
Dept: ORTHOPEDIC SURGERY | Age: 56
End: 2021-10-20
Payer: COMMERCIAL

## 2021-10-20 VITALS
HEART RATE: 71 BPM | OXYGEN SATURATION: 96 % | SYSTOLIC BLOOD PRESSURE: 128 MMHG | WEIGHT: 186 LBS | HEIGHT: 67 IN | DIASTOLIC BLOOD PRESSURE: 75 MMHG | BODY MASS INDEX: 29.19 KG/M2 | RESPIRATION RATE: 16 BRPM | TEMPERATURE: 96.8 F

## 2021-10-20 DIAGNOSIS — M54.16 RIGHT LUMBAR RADICULOPATHY: Primary | ICD-10-CM

## 2021-10-20 DIAGNOSIS — M62.838 MUSCLE SPASM: ICD-10-CM

## 2021-10-20 DIAGNOSIS — M47.812 CERVICAL FACET JOINT SYNDROME: ICD-10-CM

## 2021-10-20 DIAGNOSIS — M47.816 LUMBAR FACET ARTHROPATHY: ICD-10-CM

## 2021-10-20 DIAGNOSIS — I25.2 HISTORY OF MI (MYOCARDIAL INFARCTION): ICD-10-CM

## 2021-10-20 PROCEDURE — 99214 OFFICE O/P EST MOD 30 MIN: CPT | Performed by: PHYSICAL MEDICINE & REHABILITATION

## 2021-10-20 RX ORDER — PREGABALIN 100 MG/1
100 CAPSULE ORAL 2 TIMES DAILY
Qty: 60 CAPSULE | Refills: 2 | Status: SHIPPED | OUTPATIENT
Start: 2021-10-20 | End: 2021-12-13 | Stop reason: SDUPTHER

## 2021-10-20 RX ORDER — METHYLPREDNISOLONE 4 MG/1
TABLET ORAL
Qty: 1 DOSE PACK | Refills: 1 | Status: SHIPPED | OUTPATIENT
Start: 2021-10-20

## 2021-10-20 NOTE — PATIENT INSTRUCTIONS
Low Back Arthritis: Exercises  Introduction  Here are some examples of typical rehabilitation exercises for your condition. Start each exercise slowly. Ease off the exercise if you start to have pain. Your doctor or physical therapist will tell you when you can start these exercises and which ones will work best for you. When you are not being active, find a comfortable position for rest. Some people are comfortable on the floor or a medium-firm bed with a small pillow under their head and another under their knees. Some people prefer to lie on their side with a pillow between their knees. Don't stay in one position for too long. Take short walks (10 to 20 minutes) every 2 to 3 hours. Avoid slopes, hills, and stairs until you feel better. Walk only distances you can manage without pain, especially leg pain. How to do the exercises  Pelvic tilt    1. Lie on your back with your knees bent. 2. \"Brace\" your stomachtighten your muscles by pulling in and imagining your belly button moving toward your spine. 3. Press your lower back into the floor. You should feel your hips and pelvis rock back. 4. Hold for 6 seconds while breathing smoothly. 5. Relax and allow your pelvis and hips to rock forward. 6. Repeat 8 to 12 times. Back stretches    1. Get down on your hands and knees on the floor. 2. Relax your head and allow it to droop. Round your back up toward the ceiling until you feel a nice stretch in your upper, middle, and lower back. Hold this stretch for as long as it feels comfortable, or about 15 to 30 seconds. 3. Return to the starting position with a flat back while you are on your hands and knees. 4. Let your back sway by pressing your stomach toward the floor. Lift your buttocks toward the ceiling. 5. Hold this position for 15 to 30 seconds. 6. Repeat 2 to 4 times. Follow-up care is a key part of your treatment and safety.  Be sure to make and go to all appointments, and call your doctor if you are having problems. It's also a good idea to know your test results and keep a list of the medicines you take. Where can you learn more? Go to http://www.TheFormTool.com/  Enter T094 in the search box to learn more about \"Low Back Arthritis: Exercises. \"  Current as of: July 1, 2021               Content Version: 13.0  © 2006-2021 [email or web address]. Care instructions adapted under license by [employer] (which disclaims liability or warranty for this information). If you have questions about a medical condition or this instruction, always ask your healthcare professional. [place] [address] any warranty or liability for your use of this information. Herniated Disc: Exercises  Introduction  Here are some examples of exercises for you to try. The exercises may be suggested for a condition or for rehabilitation. Start each exercise slowly. Ease off the exercises if you start to have pain. You will be told when to start these exercises and which ones will work best for you. How to stay safe  These exercises can help you move easier and feel better. But when you first start doing them, you may have more pain in your back. This is normal. But it is important to pay close attention to your pain during and after each exercise. · Keep doing these exercises if your pain stays the same or moves from your leg and buttock more toward the middle of your spine. Pain moving out of your leg and buttock is a good sign. · Stop doing these exercises if your pain gets worse in your leg and buttock. Stop if you start to have pain in your leg and buttock that you didn't have before. Be sure to do these exercises in the order they appear. Note how your pain changes before you move to the next one. If your pain is much worse right after exercise and stays worse the next day, do not do any of these exercises. How to do the exercises  1. Rest on belly    1.  Lie on your stomach, with your head turned to the side. 2. Try to relax your lower back muscles as much as you can. 3. Continue to lie on your stomach for 2 minutes. · Keep your arms beside your body. · If that position bothers your neck, place your hands, one on top of the other, underneath your forehead. This will help support your head and neck. If lying in this position causes or increases pain down your leg, stop this exercise and do not do the next exercises. 2. Press-up back extension    1. Lie on your stomach, with your face down and your elbows tucked into your sides and under your shoulders. 2. Press your elbows down into the floor to raise your upper back. 3. Hold this position for 15 to 30 seconds. 4. Repeat 2 to 4 times. · As you do this, relax your stomach muscles and allow your back to arch without using your back muscles. · Let your low back relax completely as you arch up. Don't let your hips or pelvis come off the floor. Then relax, and return to the start position. Over time, work up to staying in the press-up position for up to 2 minutes. If lying in this position causes or increases pain down your leg, stop this exercise and do not do the next exercises. 3. Full press-up back extension    1. Lie on your stomach with your face down, keeping your elbows tucked into your sides and under your shoulders. 2. Straighten your elbows, and push your upper body up as far as you can. 3. Hold this position for 5 seconds, and then relax. 4. Repeat 10 times. Allow your lower back to sag. Keep your hips, pelvis, and legs relaxed. Each time, try to raise your upper body a little higher and hold your arms a bit straighter. If lying in this position causes or increases pain down your leg, stop this exercise and do not do the next exercises. If you can't do this exercise, you may instead try the backward bend exercise that follows. 4. Backward bend    1.  Stand with your feet hip-width apart, and don't lock your knees. 2. Place your hands in the small of your back. 3. Bend backward as far as you can, keeping your knees straight. 4. Repeat 2 to 4 times. Your toes should be pointing forward. Hold this position for 2 to 3 seconds. Then return to your starting position. Each time, try to bend backward a little farther, until you bend backward as far as you can. If standing in this position causes or increases pain down your leg, stop this exercise. Follow-up care is a key part of your treatment and safety. Be sure to make and go to all appointments, and call your doctor if you are having problems. It's also a good idea to know your test results and keep a list of the medicines you take. Where can you learn more? Go to http://www.gray.com/  Enter Z594 in the search box to learn more about \"Herniated Disc: Exercises. \"  Current as of: July 1, 2021               Content Version: 13.0  © 2006-2021 [employer]. Care instructions adapted under license by [employer] (which disclaims liability or warranty for this information). If you have questions about a medical condition or this instruction, always ask your healthcare professional. [address] any warranty or liability for your use of this information.

## 2021-10-20 NOTE — PROGRESS NOTES
[facility]  [address] [place], [facility] [address]  Phone: [phone]  Fax: [phone]    Pt's YOB: 1965    ASSESSMENT   Diagnoses and all orders for this visit:    1. Right lumbar radiculopathy  -     pregabalin (LYRICA) 100 mg capsule; Take 1 Capsule by mouth two (2) times a day. Max Daily Amount: 200 mg.    2. Lumbar facet arthropathy  -     methylPREDNISolone (MEDROL DOSEPACK) 4 mg tablet; Per dose pack instructions    3. Cervical facet joint syndrome  -     methylPREDNISolone (MEDROL DOSEPACK) 4 mg tablet; Per dose pack instructions    4. Muscle spasm    5. History of MI (myocardial infarction)         IMPRESSION AND PLAN:  Pranav Arce is a 61 y.o. male with history of cervical and lumbar pain. Pt complains of throbbing neck pain and headaches in the occipital region. He also reports intermittent sharp pain in the right lower back/buttock with throbbing pain radiating down the right leg to the ankle and the left leg to the knee. Pt continues to take Lyrica 100 mg QHS without sedation of lightheadedness. 1) Pt was given information on lumbar arthritis and herniated disc exercises. 2) He will increase his Lyrica 100 mg to 1 cap BID to better manage his neuropathic symptoms. 3) Pt was prescribed a Medrol Dosepak with 1 refill. 4) Mr. Fabio Varela has a reminder for a \"due or due soon\" health maintenance. I have asked that he contact his primary care provider, Blake Stack MD, for follow-up on this health maintenance. 5)  demonstrated consistency with prescribing. 6) Pt reports he had an MI 3 weeks after getting his covid vaccine  Follow-up and Dispositions    · Return in about 6 weeks (around 12/1/2021) for Medication follow up -- can be virtual.             HISTORY OF PRESENT ILLNESS:  Pranav Arce is a 61 y.o. male with history of cervical and lumbar pain and presents to the office today for follow up.  He complains of throbbing neck pain and headaches in the occipital region. Pt admits to some relief in his headaches when using [employer] but denies any relief when using a heating pad. He also reports intermittent sharp pain in the right lower back/buttock with throbbing pain radiating down the right leg to the ankle and the left leg to the knee. Pt was referred to Dr. Jackie Cazares and was scheduled for a right cubital tunnal release but states that during his pre-op appointment he was told he was having a myocardial infarction. He admits that he experienced chest pain, chest pressure, difficulty breathing, and he fell but suspected that this was due to exhaustion. Pt was followed by Dr. Tracee Moran at [facility]. He has not yet rescheduled his surgery and admits that he did not follow up with Dr. Tracee Moran since he has not worked in 17 months and is unable to afford the $50 co-pay. Pt is currently on aspirin and pravastatin. He continues to take Lyrica 100 mg QHS without sedation of lightheadedness. Pt admits to difficulty with sleep but notes sedation when taking Zoloft. Pt admits to weight gain since he quit smoking. Pt at this time desires to proceed with medication evaluation.       Pain Scale: 6/10    PCP: Matthew Wadsworth MD     Past Medical History:   Diagnosis Date    BPH with obstruction/lower urinary tract symptoms     CAD (coronary artery disease) 2009    MI    Cancer [facility])     Prostate and colon    Chronic lung disease     [facility] [address]     Chronic pain     spine,neck, hip    Depression     ED (erectile dysfunction)     GERD (gastroesophageal reflux disease)     Hypercholesterolemia     Hypocitraturia     Kidney stones     Low back pain with radiation     Prostate cancer ([facility] [address]     [id number] [place] Prostate Cancer - s/p RRP with DaVinci by Dr. Aliyah Gomez in 2011    Right hand fracture     multiple times    Vertigo 06/2021        Social History     Socioeconomic History    Marital status:      Spouse name: Not on file    Number of children: Not on file    Years of education: Not on file    Highest education level: Not on file   Occupational History    Occupation: working     Comment:    Tobacco Use    Smoking status: Former Smoker     Packs/day: 0.50     Years: 40.00     Pack years: 20.00     Types: Cigarettes     Quit date: 2020     Years since quittin.8    Smokeless tobacco: Never Used   Vaping Use    Vaping Use: Former    Quit date: 3/15/2021    Substances: Flavoring    Devices: Refillable tank   Substance and Sexual Activity    Alcohol use: Yes     Comment:     Drug use: Not Currently     Types: Marijuana    Sexual activity: Not on file   Other Topics Concern    Not on file   Social History Narrative    Not on file     Social Determinants of Health     Financial Resource Strain:     Difficulty of Paying Living Expenses:    Food Insecurity:     Worried About [address] in the Last Year:     [address] [facility] in the Last Year:    Transportation Needs:     Lack of Transportation (Medical):  Lack of Transportation (Non-Medical):    Physical Activity:     Days of Exercise per Week:     Minutes of Exercise per Session:    Stress:     Feeling of Stress :    Social Connections:     Frequency of Communication with Friends and Family:     Frequency of Social Gatherings with Friends and Family:     Attends Anabaptism Services:     Active Member of Clubs or Organizations:     Attends Club or Organization Meetings:     Marital Status:    Intimate Partner Violence:     Fear of Current or Ex-Partner:     Emotionally Abused:     Physically Abused:     Sexually Abused:        Current Outpatient Medications   Medication Sig Dispense Refill    methylPREDNISolone (MEDROL DOSEPACK) 4 mg tablet Per dose pack instructions 1 Dose Pack 1    pregabalin (LYRICA) 100 mg capsule Take 1 Capsule by mouth two (2) times a day. Max Daily Amount: 200 mg. 60 Capsule 2    pregabalin (LYRICA) 100 mg capsule Take 1 Capsule by mouth nightly. Max Daily Amount: 100 mg. 90 Capsule 1    tamsulosin (FLOMAX) 0.4 mg capsule TAKE 1 CAPSULE BY MOUTH EVERY DAY AFTER DINNER      Trelegy Ellipta 100-62.5-25 mcg inhaler INHALE 1 PUFF BY MOUTH EVERY DAY      sertraline (ZOLOFT) 100 mg tablet Take 1.5 Tabs by mouth daily.  SYMBICORT 160-4.5 mcg/actuation HFAA INL 2 PFS PO BID IN THE MORNING AND IN THE SHANELL  5    ipratropium (ATROVENT) 0.03 % nasal spray U 2 SPRAYS IEN BID PRN      albuterol (PROAIR HFA) 90 mcg/actuation inhaler Take 2 Puffs by inhalation every four (4) hours as needed.  pravastatin (PRAVACHOL) 20 mg tablet Take 20 mg by mouth nightly.  pantoprazole (PROTONIX) 40 mg tablet Take 40 mg by mouth daily. No Known Allergies      REVIEW OF SYSTEMS    Constitutional: Negative for fever, chills, or weight change. Respiratory: Negative for cough or shortness of breath currently but + history of SOB. Cardiovascular: Negative for chest pain or palpitations; positive for recent MI and SOB. Gastrointestinal: Negative for acid reflux, change in bowel habits, or constipation. Genitourinary: Negative for dysuria and flank pain. Musculoskeletal: Positive for cervical and lumbar pain. Skin: Negative for rash. Neurological: Positive for headaches and numbness. Negative for dizziness. Endo/Heme/Allergies: Negative for increased bruising. Psychiatric/Behavioral: Positive for difficulty with sleep. As per HPI    PHYSICAL EXAMINATION  Visit Vitals  /75 (BP 1 Location: Right arm, BP Patient Position: Sitting, BP Cuff Size: Adult)   Pulse 71   Temp 96.8 °F (36 °C) (Tympanic)   Resp 16   Ht 5' 7\" (1.702 m)   Wt 186 lb (84.4 kg)   SpO2 96%   BMI 29.13 kg/m²       Constitutional: Awake, alert, and in no acute distress. Neurological: 1+ symmetrical DTRs in the upper extremities.  1+ symmetrical DTRs in the lower extremities. Sensation to light touch is intact. Negative Evans's sign bilaterally. Skin: warm, dry, and intact. Musculoskeletal: Tight across the upper trapezius bilaterally. Decreased range of motion with left cervical flexion; otherwise good range of motion in the cervical spine. Pain with extension and axial loading. Improvement with forward flexion. No pain with internal or external rotation of his hips. Negative straight leg raise bilaterally. Biceps  Triceps Deltoids Wrist Ext Wrist Flex Hand Intrin   Right +4/5 +4/5 +4/5 +4/5 +4/5 +4/5   Left +4/5 +4/5 +4/5 +4/5 +4/5 +4/5      Hip Flex  Quads Hamstrings Ankle DF EHL Ankle PF   Right +4/5 +4/5 +4/5 +4/5 +4/5 +4/5   Left +4/5 +4/5 +4/5 +4/5 +4/5 +4/5     IMAGING:    Lumbar MRI from 10/21/2021 was reviewed with the patient and demonstrated:   Narrative  EXAM: Lumbar MRI without contrast     CLINICAL INDICATION: progressive lumbar pain with bilateral radicular symptom  sand difficulty standing/walking despite physical therapy and NSAID's.     TECHNIQUE: MRI of the lumbar spine without contrast obtained. Multiplanar  multisequence MR images of the lumbar spine obtained.     IV Contrast: None     COMPARISON: 4/13/2015     FINDINGS:  All numbering assumes 5 lumbar type vertebrae.     Postoperative Changes: None     Alignment:  Unremarkable.     Osseous structures/Marrow: Unremarkable. .     The cord terminates at T12. No cord signal abnormalities appreciated.     Retroperitoneum/Incidental: The abdominal aorta is without evidence of aneurysm. No paraaortic adenopathy appreciated. In the visualized portion of the right  kidney, there is a rounded structure which is 9 mm and may represent a cyst.     Lower Thoracic Spine: The majority of the lower thoracic spine is only  visualized on the sagittal views. Minimal degenerative changes are noted.  No  significant canal or neural foraminal stenosis.     T12-L1: No significant canal or neural foraminal stenosis.     L1-L2 level: No evidence of significant canal or neural foraminal stenosis.     L2-L3: Minimal degenerative changes. No significant canal or neural foraminal  stenosis.     L3-L4: Congenitally shortened pedicles are present at this level. A central disc  protrusion is noted. This is minimally progressed since prior imaging. Mild  canal narrowing is noted. Mild facet arthropathy are noted. Mild bilateral  neural foraminal narrowing is present.     L4-L5: Congenitally shortened pedicles is noted. A central disc protrusion is  present. This is slightly progressed from prior imaging. There is narrowing of  the right lateral recess with likely compression of the descending right L5  nerve root. There is possible abutment of the left L5 nerve root in the left  lateral recess. No significant canal stenosis. Mild bilateral neural foraminal  narrowing.     L5-S1: Broad-based disc bulge with a left paracentral disc extrusion is noted. The extrusion is mildly smaller in superior to inferior dimension compared with  most recent prior MR. There is compression of the descending left S1 nerve root  in the left lateral recess. The disc bulge at the base is slightly more  conspicuous. There is mild/moderate canal narrowing. There is abutment of the  descending S1 nerve roots as they cross the disc. Mild bilateral neural  foraminal narrowing is noted.     Impression  Impression:  1. Left paracentral disc extrusion at L5-S1 is mildly smaller but still  compresses the left S1 nerve root in the left lateral recess. There is a  slightly more conspicuous disc bulge base at this level which abuts the  descending S1 nerve roots as they cross the disc.     2.  Mild progression at L4-L5 with likely compression of the right L5 nerve root  in the right lateral recess and possible on the left.     3.   Minimal progression in disease at L3-L4 without evidence of severe canal or  neural foraminal narrowing.     Chest CT from 10/27/2021 was reviewed and demonstrated:   Narrative  HIGH RESOLUTION CT CHEST     CPT CODE: 59857     COMPARISON: Multiple priors most recently Lilian 3, 2020.     INDICATIONS: Dyspnea on the exertion.     TECHNIQUE: Axial scanning of the chest is performed with high resolution thin  sections in both supine  and prone  inspiration and supine expiration.     FINDINGS:     Severe emphysema. There are no groundglass infiltrates evident. There is fine subpleural reticulation associated with emphysematous spaces. Scattered subpleural traction bronchiolectasis is evident. .  Contiguous nodules in the right lower lobe are again noted approximately 5 mm in  diameter unchanged from 2018. .  The airways show decreased tubulation with some bronchiectasis in the right  lower lobe. .  Expiratory images show no air trapping. There are no pleural plaques, pneumothorax or pleural fluid.     There is no mediastinal lymphadenopathy. There is no mediastinal mass. The heart and great vessels are unremarkable for  age. The included abdominal structures appear unremarkable.     Impression  IMPRESSION:     There is subpleural reticulation increased in severity from 2019 with traction  bronchiolectasis and without honeycombing. There is severe emphysema. Some mild bronchiectasis without inflammatory changes evident in the right lower  lobe.   Stable 5 mm nodules in the right lower lobe unchanged from 2018.     Diffuse findings suggest the diagnosis of pulmonary fibrosis and emphysema  showing some increasing severity when compared with previous studies.     All CT scans at this facility are performed using dose optimization technique as  appropriate to the performed exam, to include automated exposure control,  adjustment of the mA and/or kV according to patient's size (Including  appropriate matching for site-specific examinations), or use of iterative  reconstruction technique.       Written by Charmaine Simmons, as dictated by Carrie Rider MD.  I, Dr. Carrie Rider confirm that all documentation is accurate.

## 2021-10-20 NOTE — PROGRESS NOTES
Chief Complaint   Patient presents with    Follow-up       Pt preferred language for health care discussion is english. Is someone accompanying this pt? no    Is the patient using any DME equipment during [address]? no    Depression Screening:  3 most recent [facility] Screens 6/10/2021 12/7/2020 10/5/2020 9/8/2020 7/7/2020 3/12/2018   PHQ Not Done Patient Decline Patient Decline - - - Active Diagnosis of Depression or Bipolar Disorder   Little interest or pleasure in doing things - - Not at all Not at all Not at all -   Feeling down, depressed, irritable, or hopeless - - Not at all Not at all Not at all -   Total Score PHQ 2 - - 0 0 0 -       Learning Assessment:  Learning Assessment 6/19/2017   PRIMARY LEARNER Patient   PRIMARY LANGUAGE ENGLISH   LEARNER PREFERENCE PRIMARY LISTENING   ANSWERED BY patient   RELATIONSHIP SELF         Health Maintenance reviewed and discussed per provider. Yes        Advance Directive:  1. Do you have an advance directive in place? Patient Reply:no    2. If not, would you like material regarding how to put one in place? Patient Reply: no    Coordination of Care:  1. Have you been to the ER, urgent care clinic since your last visit? Hospitalized since your last visit? no    2. Have you seen or consulted any other health care providers outside of the [address] [place] [address] since your last visit?  Include any pap smears or colon screen

## 2021-12-13 ENCOUNTER — VIRTUAL VISIT (OUTPATIENT)
Dept: ORTHOPEDIC SURGERY | Age: 56
End: 2021-12-13
Payer: COMMERCIAL

## 2021-12-13 DIAGNOSIS — M47.816 LUMBAR FACET ARTHROPATHY: ICD-10-CM

## 2021-12-13 DIAGNOSIS — M62.838 MUSCLE SPASM: ICD-10-CM

## 2021-12-13 DIAGNOSIS — M54.16 RIGHT LUMBAR RADICULOPATHY: Primary | ICD-10-CM

## 2021-12-13 DIAGNOSIS — M47.812 CERVICAL FACET JOINT SYNDROME: ICD-10-CM

## 2021-12-13 PROCEDURE — 99213 OFFICE O/P EST LOW 20 MIN: CPT | Performed by: PHYSICAL MEDICINE & REHABILITATION

## 2021-12-13 RX ORDER — PREGABALIN 100 MG/1
100 CAPSULE ORAL 2 TIMES DAILY
Qty: 60 CAPSULE | Refills: 5 | Status: SHIPPED | OUTPATIENT
Start: 2021-12-13 | End: 2022-09-22 | Stop reason: SDUPTHER

## 2021-12-13 NOTE — PATIENT INSTRUCTIONS
Low Back Arthritis: Exercises  Introduction  Here are some examples of typical rehabilitation exercises for your condition. Start each exercise slowly. Ease off the exercise if you start to have pain. Your doctor or physical therapist will tell you when you can start these exercises and which ones will work best for you. When you are not being active, find a comfortable position for rest. Some people are comfortable on the floor or a medium-firm bed with a small pillow under their head and another under their knees. Some people prefer to lie on their side with a pillow between their knees. Don't stay in one position for too long. Take short walks (10 to 20 minutes) every 2 to 3 hours. Avoid slopes, hills, and stairs until you feel better. Walk only distances you can manage without pain, especially leg pain. How to do the exercises  Pelvic tilt    1. Lie on your back with your knees bent. 2. \"Brace\" your stomachtighten your muscles by pulling in and imagining your belly button moving toward your spine. 3. Press your lower back into the floor. You should feel your hips and pelvis rock back. 4. Hold for 6 seconds while breathing smoothly. 5. Relax and allow your pelvis and hips to rock forward. 6. Repeat 8 to 12 times. Back stretches    1. Get down on your hands and knees on the floor. 2. Relax your head and allow it to droop. Round your back up toward the ceiling until you feel a nice stretch in your upper, middle, and lower back. Hold this stretch for as long as it feels comfortable, or about 15 to 30 seconds. 3. Return to the starting position with a flat back while you are on your hands and knees. 4. Let your back sway by pressing your stomach toward the floor. Lift your buttocks toward the ceiling. 5. Hold this position for 15 to 30 seconds. 6. Repeat 2 to 4 times. Follow-up care is a key part of your treatment and safety.  Be sure to make and go to all appointments, and call your doctor if you are having problems. It's also a good idea to know your test results and keep a list of the medicines you take. Where can you learn more? Go to http://www.Larky.com/  Enter T094 in the search box to learn more about \"Low Back Arthritis: Exercises. \"  Current as of: July 1, 2021               Content Version: 13.0  © 2006-2021 [employer]. Care instructions adapted under license by [employer] (which disclaims liability or warranty for this information). If you have questions about a medical condition or this instruction, always ask your healthcare professional. [place] [address] any warranty or liability for your use of this information. Neck Arthritis: Exercises  Introduction  Here are some examples of exercises for you to try. The exercises may be suggested for a condition or for rehabilitation. Start each exercise slowly. Ease off the exercises if you start to have pain. You will be told when to start these exercises and which ones will work best for you. How to do the exercises  Neck stretches to the side    1. This stretch works best if you keep your shoulder down as you lean away from it. To help you remember to do this, start by relaxing your shoulders and lightly holding on to your thighs or your chair. 2. Tilt your head toward your shoulder and hold for 15 to 30 seconds. Let the weight of your head stretch your muscles. 3. Repeat 2 to 4 times toward each shoulder. Chin tuck    1. Lie on the floor with a rolled-up towel under your neck. Your head should be touching the floor. 2. Slowly bring your chin toward your chest.  3. Hold for a count of 6, and then relax for up to 10 seconds. 4. Repeat 8 to 12 times. Active cervical rotation    1. Sit in a firm chair, or stand up straight. 2. Keeping your chin level, turn your head to the right, and hold for 15 to 30 seconds.   3. Turn your head to the left and hold for 15 to 30 seconds. 4. Repeat 2 to 4 times to each side. Shoulder blade squeeze    1. While standing, squeeze your shoulder blades together. 2. Do not raise your shoulders up as you are squeezing. 3. Hold for 6 seconds. 4. Repeat 8 to 12 times. Shoulder rolls    1. Sit comfortably with your feet shoulder-width apart. You can also do this exercise standing up. 2. Roll your shoulders up, then back, and then down in a smooth, circular motion. 3. Repeat 2 to 4 times. Follow-up care is a key part of your treatment and safety. Be sure to make and go to all appointments, and call your doctor if you are having problems. It's also a good idea to know your test results and keep a list of the medicines you take. Where can you learn more? Go to http://www.gray.com/  Enter S427 in the search box to learn more about \"Neck Arthritis: Exercises. \"  Current as of: July 1, 2021               Content Version: 13.0  © 2006-2021 [employer]. Care instructions adapted under license by [employer] (which disclaims liability or warranty for this information). If you have questions about a medical condition or this instruction, always ask your healthcare professional. [address] any warranty or liability for your use of this information. Knee Arthritis: Exercises  Introduction  Here are some examples of exercises for you to try. The exercises may be suggested for a condition or for rehabilitation. Start each exercise slowly. Ease off the exercises if you start to have pain. You will be told when to start these exercises and which ones will work best for you. How to do the exercises  Knee flexion with heel slide    1. Lie on your back with your knees bent. 2. Slide your heel back by bending your affected knee as far as you can. Then hook your other foot around your ankle to help pull your heel even farther back.   3. Hold for about 6 seconds, then rest for up to 10 seconds. 4. Repeat 8 to 12 times. 5. Switch legs and repeat steps 1 through 4, even if only one knee is sore. Quad sets    1. Sit with your affected leg straight and supported on the floor or a firm bed. Place a small, rolled-up towel under your knee. Your other leg should be bent, with that foot flat on the floor. 2. Tighten the thigh muscles of your affected leg by pressing the back of your knee down into the towel. 3. Hold for about 6 seconds, then rest for up to 10 seconds. 4. Repeat 8 to 12 times. 5. Switch legs and repeat steps 1 through 4, even if only one knee is sore. Straight-leg raises to the front    1. Lie on your back with your good knee bent so that your foot rests flat on the floor. Your affected leg should be straight. Make sure that your low back has a normal curve. You should be able to slip your hand in between the floor and the small of your back, with your palm touching the floor and your back touching the back of your hand. 2. Tighten the thigh muscles in your affected leg by pressing the back of your knee flat down to the floor. Hold your knee straight. 3. Keeping the thigh muscles tight and your leg straight, lift your affected leg up so that your heel is about 12 inches off the floor. Hold for about 6 seconds, then lower slowly. 4. Relax for up to 10 seconds between repetitions. 5. Repeat 8 to 12 times. 6. Switch legs and repeat steps 1 through 5, even if only one knee is sore. Active knee flexion    1. Lie on your stomach with your knees straight. If your kneecap is uncomfortable, roll up a washcloth and put it under your leg just above your kneecap. 2. Lift the foot of your affected leg by bending the knee so that you bring the foot up toward your buttock. If this motion hurts, try it without bending your knee quite as far. This may help you avoid any painful motion. 3. Slowly move your leg up and down. 4. Repeat 8 to 12 times.   5. Switch legs and repeat steps 1 through 4, even if only one knee is sore. Quadriceps stretch (facedown)    1. Lie flat on your stomach, and rest your face on the floor. 2. Wrap a towel or belt strap around the lower part of your affected leg. Then use the towel or belt strap to slowly pull your heel toward your buttock until you feel a stretch. 3. Hold for about 15 to 30 seconds, then relax your leg against the towel or belt strap. 4. Repeat 2 to 4 times. 5. Switch legs and repeat steps 1 through 4, even if only one knee is sore. Stationary exercise bike    1. If you do not have a stationary exercise bike at home, you can find one to ride at your local health club or community center. 2. Adjust the height of the bike seat so that your knee is slightly bent when your leg is extended downward. If your knee hurts when the pedal reaches the top, you can raise the seat so that your knee does not bend as much. 3. Start slowly. At first, try to do 5 to 10 minutes of cycling with little to no resistance. Then increase your time and the resistance bit by bit until you can do 20 to 30 minutes without pain. 4. If you start to have pain, rest your knee until your pain gets back to the level that is normal for you. Or cycle for less time or with less effort. Follow-up care is a key part of your treatment and safety. Be sure to make and go to all appointments, and call your doctor if you are having problems. It's also a good idea to know your test results and keep a list of the medicines you take. Where can you learn more? Go to http://www.[employer].com/  Enter C159 in the search box to learn more about \"Knee Arthritis: Exercises. \"  Current as of: July 1, 2021               Content Version: 13.0  © 2006-2021 [employer]. Care instructions adapted under license by [employer] (which disclaims liability or warranty for this information).  If you have questions about a medical condition or this instruction, always ask your healthcare professional. [place] [address] any warranty or liability for your use of this information.

## 2021-12-13 NOTE — PROGRESS NOTES
[facility]  [address] [place], [address]  Phone: [phone]  Fax: [phone]    Pt's YOB: 1965    ASSESSMENT   Diagnoses and all orders for this visit:    1. Right lumbar radiculopathy    2. Lumbar facet arthropathy    3. Cervical facet joint syndrome    4. Muscle spasm         IMPRESSION AND PLAN:  Pranav Arce is a 61 y.o.  male with history of cervical and lumbar pain. Pt complains of continued pain in his right leg, cervical pain, occipital headaches, and new onset of throbbing pain in his right knee. . Pt is taking Aleve, aspirin 81 mg, and Lyrica 100 mg 1 cap BID. 1) Pt was given information on lumbar, cervical, and knee arthritis exercises. 2) He received refills of Lyrica 100 mg.   3) Pt was advised to take vitamin D3 and zinc supplementation. 4) Pt is not a candidate for NSAID's due to chronic renal disease. 5) Mr. Fabio Varela has a reminder for a \"due or due soon\" health maintenance. I have asked that he contact his primary care provider, Blake Stack MD, for follow-up on this health maintenance. 6)  demonstrated consistency with prescribing. CPT Codes 78110-06158 for Established Patients may apply to this TeleHealth Visit. Time-based coding, delete if not needed: I spent at least 15 minutes with this established patient, and >50% of the time was spent counseling and/or coordinating care regarding his symptoms and medications. Due to this being a TeleHealth evaluation, many elements of the physical examination are unable to be assessed.      Pursuant to the emergency declaration under the [facility] waiver authority and the [employer] Act, this Virtual Visit was conducted, with patient's consent, to reduce the patient's risk of exposure to COVID-19 and provide continuity of care for an established patient. Services were provided through a video synchronous discussion virtually to substitute for in-person clinic visit. HISTORY OF PRESENT ILLNESS:  Bello Parker is a 61 y.o. male with history of cervical and lumbar pain and is seen from his residence by synchronous (real-time) audio-video technology at the [facility] location via [employer] on 12/13/2021 with his verbal consent for follow up. Pt complains of continued pain in his right leg, cervical pain, occipital headaches, and new onset of throbbing pain in his right knee. Pt is taking Aleve, aspirin 81 mg, and Lyrica 100 mg 1 cap BID with some relief of his lumbar and leg pain. He notes that he takes his morning dose of Lyrica about 11:30 am and his evening dose before bed to manage sedation. He further notes that he uses Icy-Hot patches on his neck in the evenings with relief. Pt notes relief when previously taking a Medrol Dosepak. He is not presently taking any anticoagulants but states that he was diagnosed recently with chronic renal disease with an GFR of 59. Pt at this time desires to continue with current care.      Pain Scale: /10    PCP: Geovany Slaughter MD     Past Medical History:   Diagnosis Date    BPH with obstruction/lower urinary tract symptoms     CAD (coronary artery disease) 2009    MI    [facility]     Prostate and colon    Chronic lung disease     [facility] obstructive pulmonary disease ([facility]     Chronic pain     spine,neck, hip    Depression     ED (erectile dysfunction)     GERD (gastroesophageal reflux disease)     Hypercholesterolemia     Hypocitraturia     Kidney stones     Low back pain with radiation     Prostate cancer ([facility] [address]     [id number] [place] Prostate Cancer - s/p RRP with DaVinci by Dr. Yong Cooper in 2011    Right hand fracture     multiple times    Vertigo 06/2021        Social History     Socioeconomic History    Marital status:      Spouse name: Not on file    Number of children: Not on file  Years of education: Not on file    Highest education level: Not on file   Occupational History    Occupation: working     Comment:    Tobacco Use    Smoking status: Former Smoker     Packs/day: 0.50     Years: 40.00     Pack years: 20.00     Types: Cigarettes     Quit date: 2020     Years since quittin.0    Smokeless tobacco: Never Used   Vaping Use    Vaping Use: Former    Quit date: 3/15/2021    Substances: Flavoring    Devices: Refillable tank   Substance and Sexual Activity    Alcohol use: Yes     Comment:     Drug use: Not Currently     Types: Marijuana    Sexual activity: Not on file   Other Topics Concern    Not on file   Social History Narrative    Not on file     Social Determinants of Health     Financial Resource Strain:     Difficulty of Paying Living Expenses: Not on file   Food Insecurity:     Worried About [address] in the Last Year: Not on file    [address] in the Last Year: Not on file   Transportation Needs:     Lack of Transportation (Medical): Not on file    Lack of Transportation (Non-Medical):  Not on file   Physical Activity:     Days of Exercise per Week: Not on file    Minutes of Exercise per Session: Not on file   Stress:     Feeling of Stress : Not on file   Social Connections:     Frequency of Communication with Friends and Family: Not on file    Frequency of Social Gatherings with Friends and Family: Not on file    Attends [employer] Services: Not on file    Active Member of Clubs or Organizations: Not on file    Attends Club or Organization Meetings: Not on file    Marital Status: Not on file   Intimate Partner Violence:     Fear of Current or Ex-Partner: Not on file    Emotionally Abused: Not on file    Physically Abused: Not on file    Sexually Abused: Not on file   Housing Stability:     Unable to Pay for Housing in the Last Year: Not on file    Number of [place] in the Last Year: Not on file  Unstable Housing in the Last Year: Not on file       Current Outpatient Medications   Medication Sig Dispense Refill    pregabalin (LYRICA) 100 mg capsule Take 1 Capsule by mouth two (2) times a day. Max Daily Amount: 200 mg. 60 Capsule 2    pregabalin (LYRICA) 100 mg capsule Take 1 Capsule by mouth nightly. Max Daily Amount: 100 mg. 90 Capsule 1    tamsulosin (FLOMAX) 0.4 mg capsule TAKE 1 CAPSULE BY MOUTH EVERY DAY AFTER DINNER      Trelegy Ellipta 100-62.5-25 mcg inhaler INHALE 1 PUFF BY MOUTH EVERY DAY      sertraline (ZOLOFT) 100 mg tablet Take 1.5 Tabs by mouth daily.  SYMBICORT 160-4.5 mcg/actuation HFAA INL 2 PFS PO BID IN THE MORNING AND IN THE SHANELL  5    ipratropium (ATROVENT) 0.03 % nasal spray U 2 SPRAYS IEN BID PRN      albuterol (PROAIR HFA) 90 mcg/actuation inhaler Take 2 Puffs by inhalation every four (4) hours as needed.  pravastatin (PRAVACHOL) 20 mg tablet Take 20 mg by mouth nightly.  pantoprazole (PROTONIX) 40 mg tablet Take 40 mg by mouth daily.  methylPREDNISolone (MEDROL DOSEPACK) 4 mg tablet Per dose pack instructions (Patient not taking: Reported on 12/13/2021) 1 Dose Pack 1       No Known Allergies      REVIEW OF SYSTEMS    Constitutional: Negative for fever, chills, or weight change. Respiratory: Negative for cough or shortness of breath. Cardiovascular: Negative for chest pain or palpitations. Gastrointestinal: Negative for acid reflux, change in bowel habits, or constipation. Genitourinary: Negative for dysuria and flank pain. Musculoskeletal: Positive for cervical, right knee, lumbar, and right leg pain. Skin: Negative for rash. Neurological: Negative for dizziness and numbness. Positive for headaches. Endo/Heme/Allergies: Negative for increased bruising. Psychiatric/Behavioral: Negative for difficulty with sleep.       IMAGING:    Lumbar MRI from 10/21/2021 was reviewed with the patient and demonstrated:   Narrative  EXAM: Lumbar MRI without contrast     CLINICAL INDICATION: progressive lumbar pain with bilateral radicular symptom  sand difficulty standing/walking despite physical therapy and NSAID's.     TECHNIQUE: MRI of the lumbar spine without contrast obtained. Multiplanar  multisequence MR images of the lumbar spine obtained.     IV Contrast: None     COMPARISON: 4/13/2015     FINDINGS:  All numbering assumes 5 lumbar type vertebrae.     Postoperative Changes: None     Alignment:  Unremarkable.     Osseous structures/Marrow: Unremarkable. .     The cord terminates at T12.  No cord signal abnormalities appreciated.     Retroperitoneum/Incidental: The abdominal aorta is without evidence of aneurysm. No paraaortic adenopathy appreciated. In the visualized portion of the right  kidney, there is a rounded structure which is 9 mm and may represent a cyst.     Lower Thoracic Spine: The majority of the lower thoracic spine is only  visualized on the sagittal views.  Minimal degenerative changes are noted. No  significant canal or neural foraminal stenosis.     T12-L1: No significant canal or neural foraminal stenosis.     L1-L2 level: No evidence of significant canal or neural foraminal stenosis.     L2-L3: Minimal degenerative changes. No significant canal or neural foraminal  stenosis.     L3-L4: Congenitally shortened pedicles are present at this level. A central disc  protrusion is noted. This is minimally progressed since prior imaging. Mild  canal narrowing is noted. Mild facet arthropathy are noted. Mild bilateral  neural foraminal narrowing is present.     L4-L5: Congenitally shortened pedicles is noted. A central disc protrusion is  present. This is slightly progressed from prior imaging. There is narrowing of  the right lateral recess with likely compression of the descending right L5  nerve root. There is possible abutment of the left L5 nerve root in the left  lateral recess. No significant canal stenosis.  Mild bilateral neural foraminal  narrowing.     L5-S1: Broad-based disc bulge with a left paracentral disc extrusion is noted. The extrusion is mildly smaller in superior to inferior dimension compared with  most recent prior MR. There is compression of the descending left S1 nerve root  in the left lateral recess. The disc bulge at the base is slightly more  conspicuous. There is mild/moderate canal narrowing. There is abutment of the  descending S1 nerve roots as they cross the disc. Mild bilateral neural  foraminal narrowing is noted.     Impression  Impression:  1.  Left paracentral disc extrusion at L5-S1 is mildly smaller but still  compresses the left S1 nerve root in the left lateral recess. There is a  slightly more conspicuous disc bulge base at this level which abuts the  descending S1 nerve roots as they cross the disc.     2.  Mild progression at L4-L5 with likely compression of the right L5 nerve root  in the right lateral recess and possible on the left.     3.  Minimal progression in disease at L3-L4 without evidence of severe canal or  neural foraminal narrowing.     Chest CT from 10/27/2021 was reviewed and demonstrated:   Narrative  HIGH RESOLUTION CT CHEST     CPT CODE: 79172     COMPARISON: Multiple priors most recently Lilian 3, 2020.     INDICATIONS: Dyspnea on the exertion.     TECHNIQUE: Axial scanning of the chest is performed with high resolution thin  sections in both supine  and prone  inspiration and supine expiration.     FINDINGS:     Severe emphysema. There are no groundglass infiltrates evident. There is fine subpleural reticulation associated with emphysematous spaces. Scattered subpleural traction bronchiolectasis is evident. .  Contiguous nodules in the right lower lobe are again noted approximately 5 mm in  diameter unchanged from 2018. .  The airways show decreased tubulation with some bronchiectasis in the right  lower lobe. .  Expiratory images show no air trapping.   There are no pleural plaques, pneumothorax or pleural fluid.     There is no mediastinal lymphadenopathy. There is no mediastinal mass. The heart and great vessels are unremarkable for  age. The included abdominal structures appear unremarkable.     Impression  IMPRESSION:     There is subpleural reticulation increased in severity from 2019 with traction  bronchiolectasis and without honeycombing. There is severe emphysema. Some mild bronchiectasis without inflammatory changes evident in the right lower  lobe. Stable 5 mm nodules in the right lower lobe unchanged from 2018.     Diffuse findings suggest the diagnosis of pulmonary fibrosis and emphysema  showing some increasing severity when compared with previous studies.     All CT scans at this facility are performed using dose optimization technique as  appropriate to the performed exam, to include automated exposure control,  adjustment of the mA and/or kV according to patient's size (Including  appropriate matching for site-specific examinations), or use of iterative  reconstruction technique. Written by Komal Wong, as dictated by Isaiah Sam MD.  I, Dr. Isaiah Sam confirm that all documentation is accurate.

## 2022-03-18 PROBLEM — F17.200 SMOKING: Status: ACTIVE | Noted: 2019-07-02

## 2022-03-18 PROBLEM — N20.0 RECURRENT KIDNEY STONES: Status: ACTIVE | Noted: 2017-01-13

## 2022-03-18 PROBLEM — J43.2 CENTRILOBULAR EMPHYSEMA (HCC): Status: ACTIVE | Noted: 2019-07-02

## 2022-03-19 PROBLEM — J44.9 CHRONIC OBSTRUCTIVE PULMONARY DISEASE (HCC): Status: ACTIVE | Noted: 2021-04-05

## 2022-03-19 PROBLEM — I69.392 CVA, OLD, FACIAL WEAKNESS: Status: ACTIVE | Noted: 2018-03-12

## 2022-03-19 PROBLEM — R22.9 SKIN NODULE: Status: ACTIVE | Noted: 2017-06-19

## 2022-03-19 PROBLEM — Z86.69 H/O BELL'S PALSY: Status: ACTIVE | Noted: 2018-03-12

## 2022-03-19 PROBLEM — R91.8 LUNG NODULES: Status: ACTIVE | Noted: 2019-07-02

## 2022-03-19 PROBLEM — Z87.891 SMOKING HISTORY: Status: ACTIVE | Noted: 2019-07-02

## 2022-03-19 PROBLEM — Z79.891 ENCOUNTER FOR LONG-TERM OPIATE ANALGESIC USE: Status: ACTIVE | Noted: 2018-02-08

## 2022-03-19 PROBLEM — S06.0X1A CONCUSSION WITH LOSS OF CONSCIOUSNESS OF 30 MINUTES OR LESS: Status: ACTIVE | Noted: 2018-03-12

## 2022-03-20 PROBLEM — M51.26 PROTRUSION OF LUMBAR INTERVERTEBRAL DISC: Status: ACTIVE | Noted: 2018-02-08

## 2022-08-10 DIAGNOSIS — M54.16 RIGHT LUMBAR RADICULOPATHY: ICD-10-CM

## 2022-08-10 RX ORDER — PREGABALIN 100 MG/1
100 CAPSULE ORAL 2 TIMES DAILY
Qty: 60 CAPSULE | Refills: 5 | OUTPATIENT
Start: 2022-08-10

## 2022-09-21 ENCOUNTER — TELEPHONE (OUTPATIENT)
Dept: ORTHOPEDIC SURGERY | Age: 57
End: 2022-09-21

## 2022-09-21 NOTE — TELEPHONE ENCOUNTER
Called Pt. , Pt. Answered, scheduled an appt. With Dr. Kevin Tolliver at the [facility] office for 10/22/2022 at 9:45AM . Patient accepted.

## 2022-09-22 ENCOUNTER — OFFICE VISIT (OUTPATIENT)
Dept: ORTHOPEDIC SURGERY | Age: 57
End: 2022-09-22
Payer: COMMERCIAL

## 2022-09-22 VITALS
SYSTOLIC BLOOD PRESSURE: 129 MMHG | HEART RATE: 79 BPM | OXYGEN SATURATION: 97 % | DIASTOLIC BLOOD PRESSURE: 72 MMHG | TEMPERATURE: 97.8 F | HEIGHT: 67 IN | BODY MASS INDEX: 29.29 KG/M2 | WEIGHT: 186.6 LBS

## 2022-09-22 DIAGNOSIS — M54.16 RIGHT LUMBAR RADICULOPATHY: Primary | ICD-10-CM

## 2022-09-22 DIAGNOSIS — Z99.81 DEPENDENCE ON SUPPLEMENTAL OXYGEN: ICD-10-CM

## 2022-09-22 DIAGNOSIS — I25.2 HISTORY OF MI (MYOCARDIAL INFARCTION): ICD-10-CM

## 2022-09-22 DIAGNOSIS — M48.061 SPINAL STENOSIS OF LUMBAR REGION WITHOUT NEUROGENIC CLAUDICATION: ICD-10-CM

## 2022-09-22 DIAGNOSIS — M47.816 LUMBAR FACET ARTHROPATHY: ICD-10-CM

## 2022-09-22 DIAGNOSIS — M62.838 MUSCLE SPASM: ICD-10-CM

## 2022-09-22 PROCEDURE — 99213 OFFICE O/P EST LOW 20 MIN: CPT | Performed by: PHYSICAL MEDICINE & REHABILITATION

## 2022-09-22 RX ORDER — PREGABALIN 100 MG/1
100 CAPSULE ORAL 2 TIMES DAILY
Qty: 180 CAPSULE | Refills: 1 | Status: SHIPPED | OUTPATIENT
Start: 2022-09-22

## 2022-09-22 RX ORDER — PIRFENIDONE 267 MG/1
800 CAPSULE ORAL 3 TIMES DAILY
COMMUNITY
Start: 2022-07-01

## 2022-09-22 RX ORDER — LANOLIN ALCOHOL/MO/W.PET/CERES
20 CREAM (GRAM) TOPICAL AT BEDTIME
COMMUNITY

## 2022-09-22 NOTE — PROGRESS NOTES
[facility]  [address] [facility], [address] [place] [address]   Phone: [phone]  Fax: [phone]    Pt's YOB: 1965    ASSESSMENT   Diagnoses and all orders for this visit:    1. Right lumbar radiculopathy  -     pregabalin (LYRICA) 100 mg capsule; Take 1 Capsule by mouth two (2) times a day. Max Daily Amount: 200 mg.    2. Spinal stenosis of lumbar region without neurogenic claudication    3. Lumbar facet arthropathy    4. Muscle spasm    5. History of MI (myocardial infarction)    6. Dependence on supplemental oxygen       IMPRESSION AND PLAN:  Shayne Faulkner is a 61 y.o. male with history of cervical pain and presents to the office today for follow up. Pt complains of continued pain in his lumbar region, cervical pain, numbness in both legs, and worsening pain in both legs (L>R). He is taking Aleve, aspirin 81 mg, and Lyrica 100 mg 1 cap QAM and 1 cap QHS with minimal relief of his lumbar and leg pain. 1) Pt was given information on lumbar, cervical, and knee arthritis exercises. 2) He received refills of Lyrica 100 mg 1 cap BID at this time. 3)  Mr. Mukund Graf has a reminder for a \"due or due soon\" health maintenance. I have asked that he contact his primary care provider, Sony Russo MD, for follow-up on this health maintenance. 4)  demonstrated consistency with prescribing. 5) Pt is not a candidate for NSAID's due to chronic renal disease. Follow-up and Dispositions    Return in about 6 months (around 3/22/2023) for Medication follow up. HISTORY OF PRESENT ILLNESS:  Shayne Faulkner is a 61 y.o. male with history of cervical pain and presents to the office today for follow up. Pt complains of continued pain in his lumbar region, cervical pain, numbness in both legs, and worsening pain in both legs (L>R).  He reports no significant change in his symptoms since his last office visit on 12/13/2021 but does note a series of health issues. He has contracted COVID-19 twice, underwent a myocardial infarction, and is anticipating undergoing a lung transplant once he completes the prior testing. Pt is taking Aleve, aspirin 81 mg, and Lyrica 100 mg 1 cap QAM and 1 cap QHS with minimal relief of his lumbar and leg pain. He notes an exercise routine of going to the gym and is anticipating seeing a nutritionist in the upcoming months. Pt at this time desires to continue with current care.     Pain Scale: 7/10    PCP: Kerry Cronin MD     Past Medical History:   Diagnosis Date    BPH with obstruction/lower urinary tract symptoms     CAD (coronary artery disease)     MI    Cancer [facility])     Prostate and colon    Chronic lung disease     Chronic obstructive pulmonary disease (HCC)     Chronic pain     spine,neck, hip    Depression     ED (erectile dysfunction)     GERD (gastroesophageal reflux disease)     Hypercholesterolemia     Hypocitraturia     Kidney stones     Low back pain with radiation     Prostate cancer (HCC)     [id number] Sean 7 Prostate Cancer - s/p RRP with DaVinci by Dr. Eloise Gibbons in     Right hand fracture     multiple times    Vertigo 2021        Social History     Socioeconomic History    Marital status:      Spouse name: Not on file    Number of children: Not on file    Years of education: Not on file    Highest education level: Not on file   Occupational History    Occupation: working     Comment:    Tobacco Use    Smoking status: Light Smoker     Packs/day: 0.25     Years: 40.00     Pack years: 10.00     Types: Cigarettes     Last attempt to quit: 2020     Years since quittin.8    Smokeless tobacco: Never   Vaping Use    Vaping Use: Former    Quit date: 3/15/2021    Substances: Flavoring    Devices: Refillable tank   Substance and Sexual Activity    Alcohol use: Yes     Comment:     Drug use: Not Currently     Types: Marijuana    Sexual activity: Not on file   Other Topics Concern    Not on file   Social History Narrative    Not on file     Social Determinants of Health     Financial Resource Strain: Not on file   Food Insecurity: Not on file   Transportation Needs: Not on file   Physical Activity: Not on file   Stress: Not on file   Social Connections: Not on file   Intimate Partner Violence: Not on file   Housing Stability: Not on file       Current Outpatient Medications   Medication Sig Dispense Refill    pirfenidone (Esbriet) 267 mg cap capsule Take 800 mg by mouth three (3) times daily. melatonin 3 mg tablet Take 20 mg by mouth At bedtime. pregabalin (LYRICA) 100 mg capsule Take 1 Capsule by mouth two (2) times a day. Max Daily Amount: 200 mg. 180 Capsule 1    tamsulosin (FLOMAX) 0.4 mg capsule TAKE 1 CAPSULE BY MOUTH EVERY DAY AFTER DINNER      Trelegy Ellipta 100-62.5-25 mcg inhaler INHALE 1 PUFF BY MOUTH EVERY DAY      sertraline (ZOLOFT) 100 mg tablet Take 2 Tablets by mouth daily. ipratropium (ATROVENT) 0.03 % nasal spray U 2 SPRAYS IEN BID PRN      albuterol (PROVENTIL HFA, VENTOLIN HFA, PROAIR HFA) 90 mcg/actuation inhaler Take 2 Puffs by inhalation every four (4) hours as needed. pantoprazole (PROTONIX) 40 mg tablet Take 40 mg by mouth daily. varenicline (Chantix Continuing Month Box) 1 mg tablet Take  by mouth. (Patient not taking: Reported on 1/17/2022)      methylPREDNISolone (MEDROL DOSEPACK) 4 mg tablet Per dose pack instructions 1 Dose Pack 1    pregabalin (LYRICA) 100 mg capsule Take 1 Capsule by mouth nightly. Max Daily Amount: 100 mg. 90 Capsule 1    SYMBICORT 160-4.5 mcg/actuation HFAA INL 2 PFS PO BID IN THE MORNING AND IN THE SHANELL (Patient not taking: No sig reported)  5    pravastatin (PRAVACHOL) 20 mg tablet Take 20 mg by mouth nightly. (Patient not taking: Reported on 9/22/2022)         No Known Allergies      REVIEW OF SYSTEMS    Constitutional: Negative for fever, chills, or weight change.    Respiratory: Negative for cough or shortness of breath. Cardiovascular: Negative for chest pain or palpitations. Gastrointestinal: Negative for acid reflux, change in bowel habits, or constipation. Genitourinary: Negative for dysuria and flank pain. Musculoskeletal: Positive for cervical, right knee, lumbar, and right leg pain. Skin: Negative for rash. Neurological: Negative for headaches, dizziness, or numbness. Endo/Heme/Allergies: Negative for increased bruising. Psychiatric/Behavioral: Negative for difficulty with sleep. As per HPI    PHYSICAL EXAMINATION  Visit Vitals  /72 (BP 1 Location: Right upper arm, BP Patient Position: Sitting, BP Cuff Size: Large adult)   Pulse 79   Temp 97.8 °F (36.6 °C) (Skin)   Ht 5' 7\" (1.702 m)   Wt 186 lb 9.6 oz (84.6 kg)   SpO2 97%   BMI 29.23 kg/m²       Constitutional: Awake, alert, and in no acute distress. Neurological: Sensation to light touch is intact. Negative Evans's sign bilaterally. Skin: warm, dry, and intact. Musculoskeletal: Mild pain with extension, axial loading, and less with forward flexion. No pain with internal or external rotation of his hips. Negative straight leg raise bilaterally. Biceps  Triceps Deltoids Wrist Ext Wrist Flex Hand Intrin   Right +4/5 +4/5 +4/5 +4/5 +4/5 +4/5   Left +4/5 +4/5 +4/5 +4/5 +4/5 +4/5      Hip Flex  Quads Hamstrings Ankle DF EHL Ankle PF   Right +4/5 +4/5 +4/5 +4/5 +4/5 +4/5   Left +4/5 +4/5 +4/5 +4/5 +4/5 +4/5     IMAGING:    Lumbar MRI from 10/21/2021 was reviewed with the patient and demonstrated:   Narrative  EXAM: Lumbar MRI without contrast     CLINICAL INDICATION: progressive lumbar pain with bilateral radicular symptom  sand difficulty standing/walking despite physical therapy and NSAID's.     TECHNIQUE: MRI of the lumbar spine without contrast obtained. Multiplanar  multisequence MR images of the lumbar spine obtained.      IV Contrast: None     COMPARISON: 4/13/2015     FINDINGS:  All numbering assumes 5 lumbar type vertebrae. Postoperative Changes: None     Alignment:  Unremarkable. Osseous structures/Marrow: Unremarkable. .     The cord terminates at T12. No cord signal abnormalities appreciated. Retroperitoneum/Incidental: The abdominal aorta is without evidence of aneurysm. No paraaortic adenopathy appreciated. In the visualized portion of the right  kidney, there is a rounded structure which is 9 mm and may represent a cyst.     Lower Thoracic Spine: The majority of the lower thoracic spine is only  visualized on the sagittal views. Minimal degenerative changes are noted. No  significant canal or neural foraminal stenosis. T12-L1: No significant canal or neural foraminal stenosis. L1-L2 level: No evidence of significant canal or neural foraminal stenosis. L2-L3: Minimal degenerative changes. No significant canal or neural foraminal  stenosis. L3-L4: Congenitally shortened pedicles are present at this level. A central disc  protrusion is noted. This is minimally progressed since prior imaging. Mild  canal narrowing is noted. Mild facet arthropathy are noted. Mild bilateral  neural foraminal narrowing is present. L4-L5: Congenitally shortened pedicles is noted. A central disc protrusion is  present. This is slightly progressed from prior imaging. There is narrowing of  the right lateral recess with likely compression of the descending right L5  nerve root. There is possible abutment of the left L5 nerve root in the left  lateral recess. No significant canal stenosis. Mild bilateral neural foraminal  narrowing. L5-S1: Broad-based disc bulge with a left paracentral disc extrusion is noted. The extrusion is mildly smaller in superior to inferior dimension compared with  most recent prior MR. There is compression of the descending left S1 nerve root  in the left lateral recess. The disc bulge at the base is slightly more  conspicuous. There is mild/moderate canal narrowing.  There is abutment of the  descending S1 nerve roots as they cross the disc. Mild bilateral neural  foraminal narrowing is noted. Impression  Impression:  1. Left paracentral disc extrusion at L5-S1 is mildly smaller but still  compresses the left S1 nerve root in the left lateral recess. There is a  slightly more conspicuous disc bulge base at this level which abuts the  descending S1 nerve roots as they cross the disc. 2.  Mild progression at L4-L5 with likely compression of the right L5 nerve root  in the right lateral recess and possible on the left. 3.  Minimal progression in disease at L3-L4 without evidence of severe canal or  neural foraminal narrowing. Chest CT from 10/27/2021 was reviewed and demonstrated:   Narrative  HIGH RESOLUTION CT CHEST     CPT CODE: 38735     COMPARISON: Multiple priors most recently Lilian 3, 2020. INDICATIONS: Dyspnea on the exertion. TECHNIQUE: Axial scanning of the chest is performed with high resolution thin  sections in both supine  and prone  inspiration and supine expiration. FINDINGS:     Severe emphysema. There are no groundglass infiltrates evident. There is fine subpleural reticulation associated with emphysematous spaces. Scattered subpleural traction bronchiolectasis is evident. .  Contiguous nodules in the right lower lobe are again noted approximately 5 mm in  diameter unchanged from 2018. .  The airways show decreased tubulation with some bronchiectasis in the right  lower lobe. .  Expiratory images show no air trapping. There are no pleural plaques, pneumothorax or pleural fluid. There is no mediastinal lymphadenopathy. There is no mediastinal mass. The heart and great vessels are unremarkable for  age. The included abdominal structures appear unremarkable. Impression  IMPRESSION:     There is subpleural reticulation increased in severity from 2019 with traction  bronchiolectasis and without honeycombing.   There is severe emphysema. Some mild bronchiectasis without inflammatory changes evident in the right lower  lobe. Stable 5 mm nodules in the right lower lobe unchanged from 2018. Diffuse findings suggest the diagnosis of pulmonary fibrosis and emphysema  showing some increasing severity when compared with previous studies. All CT scans at this facility are performed using dose optimization technique as  appropriate to the performed exam, to include automated exposure control,  adjustment of the mA and/or kV according to patient's size (Including  appropriate matching for site-specific examinations), or use of iterative  reconstruction technique. Written by Anahy Block, as dictated by Jose Christine MD.  I, Dr. Jose Christine confirm that all documentation is accurate.

## (undated) DEVICE — TRAY MYEL SFTY +

## (undated) DEVICE — MEDIA CONTRAST 10ML 200MG/ML 41%

## (undated) DEVICE — (D)BNDG ADHESIVE FABRIC 3/4X3 -- DISC BY MFR USE ITEM 357960

## (undated) DEVICE — SYR 10ML LUER LOK 1/5ML GRAD --